# Patient Record
Sex: FEMALE | Race: WHITE | NOT HISPANIC OR LATINO | Employment: OTHER | URBAN - METROPOLITAN AREA
[De-identification: names, ages, dates, MRNs, and addresses within clinical notes are randomized per-mention and may not be internally consistent; named-entity substitution may affect disease eponyms.]

---

## 2019-04-25 ENCOUNTER — OFFICE VISIT (OUTPATIENT)
Dept: FAMILY MEDICINE CLINIC | Facility: CLINIC | Age: 64
End: 2019-04-25

## 2019-04-25 VITALS
OXYGEN SATURATION: 98 % | DIASTOLIC BLOOD PRESSURE: 88 MMHG | BODY MASS INDEX: 42.75 KG/M2 | HEIGHT: 66 IN | HEART RATE: 78 BPM | SYSTOLIC BLOOD PRESSURE: 132 MMHG | RESPIRATION RATE: 14 BRPM | WEIGHT: 266 LBS

## 2019-04-25 DIAGNOSIS — I10 ESSENTIAL HYPERTENSION: ICD-10-CM

## 2019-04-25 DIAGNOSIS — E78.5 HYPERLIPIDEMIA, UNSPECIFIED HYPERLIPIDEMIA TYPE: ICD-10-CM

## 2019-04-25 DIAGNOSIS — E11.628 DIABETIC FOOT INFECTION (HCC): ICD-10-CM

## 2019-04-25 DIAGNOSIS — I73.9 PERIPHERAL ARTERIAL DISEASE (HCC): ICD-10-CM

## 2019-04-25 DIAGNOSIS — M10.9 GOUT, UNSPECIFIED CAUSE, UNSPECIFIED CHRONICITY, UNSPECIFIED SITE: ICD-10-CM

## 2019-04-25 DIAGNOSIS — L08.9 DIABETIC FOOT INFECTION (HCC): ICD-10-CM

## 2019-04-25 DIAGNOSIS — E11.52 TYPE 2 DIABETES MELLITUS WITH DIABETIC PERIPHERAL ANGIOPATHY AND GANGRENE, WITHOUT LONG-TERM CURRENT USE OF INSULIN (HCC): Primary | ICD-10-CM

## 2019-04-25 LAB
ALBUMIN SERPL-MCNC: 4.3 G/DL (ref 3.5–5.2)
ALBUMIN/GLOB SERPL: 1.4 G/DL
ALP SERPL-CCNC: 107 U/L (ref 39–117)
ALT SERPL-CCNC: 11 U/L (ref 1–33)
AST SERPL-CCNC: 9 U/L (ref 1–32)
BILIRUB SERPL-MCNC: 0.3 MG/DL (ref 0.2–1.2)
BUN SERPL-MCNC: 15 MG/DL (ref 8–23)
BUN/CREAT SERPL: 22.1 (ref 7–25)
CALCIUM SERPL-MCNC: 10 MG/DL (ref 8.6–10.5)
CHLORIDE SERPL-SCNC: 98 MMOL/L (ref 98–107)
CHOLEST SERPL-MCNC: 232 MG/DL (ref 0–200)
CO2 SERPL-SCNC: 25.7 MMOL/L (ref 22–29)
CREAT SERPL-MCNC: 0.68 MG/DL (ref 0.57–1)
GLOBULIN SER CALC-MCNC: 3.1 GM/DL
GLUCOSE SERPL-MCNC: 250 MG/DL (ref 65–99)
HBA1C MFR BLD: 10.3 % (ref 4.8–5.6)
HDLC SERPL-MCNC: 49 MG/DL (ref 40–60)
LDLC SERPL CALC-MCNC: 138 MG/DL (ref 0–100)
POTASSIUM SERPL-SCNC: 4.6 MMOL/L (ref 3.5–5.2)
PROT SERPL-MCNC: 7.4 G/DL (ref 6–8.5)
SODIUM SERPL-SCNC: 137 MMOL/L (ref 136–145)
TRIGL SERPL-MCNC: 226 MG/DL (ref 0–150)
TSH SERPL DL<=0.005 MIU/L-ACNC: 1.28 MIU/ML (ref 0.27–4.2)
URATE SERPL-MCNC: 4 MG/DL (ref 2.4–5.7)
VLDLC SERPL CALC-MCNC: 45.2 MG/DL

## 2019-04-25 RX ORDER — GLIPIZIDE 5 MG/1
5 TABLET ORAL
Qty: 60 TABLET | Refills: 1 | Status: SHIPPED | OUTPATIENT
Start: 2019-04-25 | End: 2019-05-10 | Stop reason: DRUGHIGH

## 2019-04-25 RX ORDER — LISINOPRIL 5 MG/1
5 TABLET ORAL DAILY
Qty: 30 TABLET | Refills: 3 | Status: SHIPPED | OUTPATIENT
Start: 2019-04-25 | End: 2021-04-02

## 2019-04-25 RX ORDER — SULFAMETHOXAZOLE AND TRIMETHOPRIM 800; 160 MG/1; MG/1
1 TABLET ORAL 2 TIMES DAILY
Qty: 20 TABLET | Refills: 0 | Status: SHIPPED | OUTPATIENT
Start: 2019-04-25 | End: 2019-05-29

## 2019-04-26 LAB
ERYTHROCYTE [DISTWIDTH] IN BLOOD BY AUTOMATED COUNT: 15.8 % (ref 12.3–15.4)
HCT VFR BLD AUTO: 46.4 % (ref 34–46.6)
HGB BLD-MCNC: 14.1 G/DL (ref 12–15.9)
MCH RBC QN AUTO: 26.2 PG (ref 26.6–33)
MCHC RBC AUTO-ENTMCNC: 30.4 G/DL (ref 31.5–35.7)
MCV RBC AUTO: 86.1 FL (ref 79–97)
PLATELET # BLD AUTO: 248 10*3/MM3 (ref 140–450)
RBC # BLD AUTO: 5.39 10*6/MM3 (ref 3.77–5.28)
WBC # BLD AUTO: 8.95 10*3/MM3 (ref 3.4–10.8)

## 2019-05-10 DIAGNOSIS — E11.52 TYPE 2 DIABETES MELLITUS WITH DIABETIC PERIPHERAL ANGIOPATHY AND GANGRENE, WITHOUT LONG-TERM CURRENT USE OF INSULIN (HCC): ICD-10-CM

## 2019-05-10 RX ORDER — GLIPIZIDE 10 MG/1
10 TABLET ORAL
Qty: 90 TABLET | Refills: 0 | Status: SHIPPED | OUTPATIENT
Start: 2019-05-10 | End: 2019-07-09 | Stop reason: SDUPTHER

## 2019-05-10 RX ORDER — ATORVASTATIN CALCIUM 20 MG/1
20 TABLET, FILM COATED ORAL DAILY
Qty: 90 TABLET | Refills: 0 | Status: SHIPPED | OUTPATIENT
Start: 2019-05-10 | End: 2021-04-02

## 2019-05-29 ENCOUNTER — OFFICE VISIT (OUTPATIENT)
Dept: FAMILY MEDICINE CLINIC | Facility: CLINIC | Age: 64
End: 2019-05-29

## 2019-05-29 VITALS
DIASTOLIC BLOOD PRESSURE: 72 MMHG | SYSTOLIC BLOOD PRESSURE: 136 MMHG | HEART RATE: 72 BPM | WEIGHT: 267 LBS | RESPIRATION RATE: 18 BRPM | BODY MASS INDEX: 42.91 KG/M2 | OXYGEN SATURATION: 97 % | HEIGHT: 66 IN

## 2019-05-29 DIAGNOSIS — I73.9 PAD (PERIPHERAL ARTERY DISEASE) (HCC): Primary | ICD-10-CM

## 2019-05-29 DIAGNOSIS — E11.52 TYPE 2 DIABETES MELLITUS WITH DIABETIC PERIPHERAL ANGIOPATHY AND GANGRENE, WITHOUT LONG-TERM CURRENT USE OF INSULIN (HCC): Primary | ICD-10-CM

## 2019-05-29 DIAGNOSIS — Z72.0 TOBACCO ABUSE: ICD-10-CM

## 2019-05-29 DIAGNOSIS — I10 ESSENTIAL HYPERTENSION: ICD-10-CM

## 2019-05-29 DIAGNOSIS — E11.52 TYPE 2 DIABETES MELLITUS WITH DIABETIC PERIPHERAL ANGIOPATHY AND GANGRENE, WITHOUT LONG-TERM CURRENT USE OF INSULIN (HCC): ICD-10-CM

## 2019-05-29 DIAGNOSIS — R53.83 FATIGUE, UNSPECIFIED TYPE: ICD-10-CM

## 2019-05-29 DIAGNOSIS — I73.9 PERIPHERAL ARTERIAL DISEASE (HCC): ICD-10-CM

## 2019-05-29 DIAGNOSIS — I96 GANGRENE OF TOE OF RIGHT FOOT (HCC): ICD-10-CM

## 2019-05-29 PROBLEM — IMO0002 DIABETES MELLITUS TYPE 2, UNCONTROLLED, WITH COMPLICATIONS: Status: ACTIVE | Noted: 2017-04-12

## 2019-05-29 PROBLEM — E66.01 MORBID OBESITY: Status: ACTIVE | Noted: 2017-04-11

## 2019-05-29 PROBLEM — Z91.199 NONCOMPLIANCE: Status: ACTIVE | Noted: 2018-03-29

## 2019-05-29 PROBLEM — R06.09 DOE (DYSPNEA ON EXERTION): Status: ACTIVE | Noted: 2017-04-11

## 2019-05-29 PROBLEM — T14.8XXA WOUND INFECTION: Status: ACTIVE | Noted: 2018-04-19

## 2019-05-29 PROBLEM — L08.9 WOUND INFECTION: Status: ACTIVE | Noted: 2018-04-19

## 2019-05-29 PROCEDURE — 99215 OFFICE O/P EST HI 40 MIN: CPT | Performed by: FAMILY MEDICINE

## 2019-05-29 NOTE — PROGRESS NOTES
Subjective   Glenis Anderson is a 63 y.o. female.     History of Present Illness   She is a new patient to me today.  Previous pt of Dr Jackson here for surgical clearance.She has seen Dr. Jackson only once before on 4/25/2019.    She is scheduled for an amputation of 2nd toe Rt foot 5/31/19.  She is a smoker and has hx of uncontrolled  diabetes , hypertension, hyperlipidemia and peripheral vascular disease. She states she stopped Eliquis last Friday on the advice of her podiatrist..  She has not had a cardiac evaluation and she states she has not had chest pain or SOA.However, a conversation with her podiatrist indicated that she has a past hx of congestive heart failure per her report.    Uncontrolled DM2   Patient states that she stopped taking Lantis a few months ago due to cost. She did not tolerate metformin due to diarrhea. She was started on glipizide 10 mg twice a day by Dr. Jackson  On 5/10/2019. She says she is tolerating well. She has states she has little feeling in her feet.  HTN:  Previously managed with lisinopril. She states that she discontinued this medication because she did not feel that she needed it. re-started her on lisinopril 5 mg at her last visit  She is tolerating well. She denies any chest pain, dizziness, headaches, or acute vision changes.     Tobacco Abuse:  She has a greater than 50 year pack hx of cigarette smoking and is not motivated to quit.      Hx of PAD:  Patient currently takes apixaban 5 mg/daily. She reports several bilateral leg operations including femoral artery stents with Dr. Escalona and Dr. Girard. She also had her right great toe amputated due to gangrene about 3 years ago. She had her right second toenail removed about 2 months ago. She reports swelling, redness, and yellowish discoloration to the toe. She has been soaking her toe, cleaning the area with peroxide, and applying antibiotic cream. She denies any fevers or foot pain. She was seen and  evaluated by podiatrist, Dr. Bains, and he has determined she has dry gangrene and needs an amputation.    Gangrene of toe of right foot  I have reviewed procedure and timeline with Dr. Bains. He agrees with me that her chronic conditions and lifestyle put her at high risk for cardiac disease and he agrees that a cardiac evaluation prior to surgery would be in her best interest. He is concerned that the gangrene has progressed and she may need a more extensive surgery.         The following portions of the patient's history were reviewed and updated as appropriate: allergies, current medications, past family history, past medical history, past social history, past surgical history and problem list.    Review of Systems   Constitutional: Negative.    HENT: Negative.    Eyes: Negative.  Negative for visual disturbance.   Respiratory: Negative.    Cardiovascular: Negative.  Negative for chest pain and leg swelling.   Genitourinary: Negative.    Skin: Negative.    Neurological: Positive for numbness. Negative for dizziness and headache.   Hematological: Negative.    Psychiatric/Behavioral: Negative.        Objective   Physical Exam   Constitutional: She is oriented to person, place, and time. She appears well-developed.   HENT:   Head: Normocephalic and atraumatic.   edentulous   Eyes: EOM are normal. Pupils are equal, round, and reactive to light.   Neck: Normal range of motion. Neck supple.   Cardiovascular: Normal rate, regular rhythm and normal heart sounds.   Pulmonary/Chest: Effort normal. No respiratory distress.   Distant breath sounds   Abdominal: Soft. Bowel sounds are normal.   Musculoskeletal: Normal range of motion. She exhibits edema.   Right great toe absent, 2nd toe wrapped.   Neurological: She is alert and oriented to person, place, and time. A sensory deficit is present.   Skin: Skin is warm and dry. No rash noted.   Psychiatric: She has a normal mood and affect. Her behavior is normal. Judgment and  thought content normal.   Nursing note and vitals reviewed.        Assessment/Plan   Glenis was seen today for pre-op exam.  Current labs, past medical records and surgical plan reviewed and on this chart.  I was not comfortable clearing her without a cardiac consult.  I have advised her to re-start Eliquis.    Diagnoses and all orders for this visit:    PAD (peripheral artery disease) (CMS/[facility])  -     Ambulatory Referral to Cardiology  -     CBC (No Diff)  -     Comprehensive metabolic panel  She is going to re-start Eliquis.    Type 2 diabetes mellitus with diabetic peripheral angiopathy and gangrene, without long-term current use of insulin (CMS/[facility])  She is now taking glipzide, Lipitor and lisinopril. Will re-check HBA 1C in 2 months. Advised a healthy diet.   -     Ambulatory Referral to Cardiology  -     CBC (No Diff)  -     Comprehensive metabolic panel    Essential hypertension  She is tolerating lisinopril well.   -     CBC (No Diff)  -     Comprehensive metabolic panel    Tobacco abuse  She is not motivated to quit.    Gangrene of toe of right foot (CMS/[facility])  Surgery will be postponed until a cardiac consult is completed and cardiologist advice incorporated into surgical plan of care.    I have reviewed all the above with referring podiatrist and he agrees with this plan.    I spent over 50% of this 50 minute visit in face to face  with Glenis .

## 2019-05-30 LAB
ALBUMIN SERPL-MCNC: 3.8 G/DL (ref 3.5–5.2)
ALBUMIN/GLOB SERPL: 1.3 G/DL
ALP SERPL-CCNC: 113 U/L (ref 39–117)
ALT SERPL-CCNC: 27 U/L (ref 1–33)
AST SERPL-CCNC: 15 U/L (ref 1–32)
BILIRUB SERPL-MCNC: 0.3 MG/DL (ref 0.2–1.2)
BUN SERPL-MCNC: 16 MG/DL (ref 8–23)
BUN/CREAT SERPL: 24.2 (ref 7–25)
CALCIUM SERPL-MCNC: 9.9 MG/DL (ref 8.6–10.5)
CHLORIDE SERPL-SCNC: 102 MMOL/L (ref 98–107)
CO2 SERPL-SCNC: 27.8 MMOL/L (ref 22–29)
CREAT SERPL-MCNC: 0.66 MG/DL (ref 0.57–1)
ERYTHROCYTE [DISTWIDTH] IN BLOOD BY AUTOMATED COUNT: 15.8 % (ref 12.3–15.4)
GLOBULIN SER CALC-MCNC: 3 GM/DL
GLUCOSE SERPL-MCNC: 157 MG/DL (ref 65–99)
HCT VFR BLD AUTO: 44.7 % (ref 34–46.6)
HGB BLD-MCNC: 13.6 G/DL (ref 12–15.9)
MCH RBC QN AUTO: 26.7 PG (ref 26.6–33)
MCHC RBC AUTO-ENTMCNC: 30.4 G/DL (ref 31.5–35.7)
MCV RBC AUTO: 87.8 FL (ref 79–97)
PLATELET # BLD AUTO: 266 10*3/MM3 (ref 140–450)
POTASSIUM SERPL-SCNC: 4.6 MMOL/L (ref 3.5–5.2)
PROT SERPL-MCNC: 6.8 G/DL (ref 6–8.5)
RBC # BLD AUTO: 5.09 10*6/MM3 (ref 3.77–5.28)
SODIUM SERPL-SCNC: 142 MMOL/L (ref 136–145)
WBC # BLD AUTO: 9.52 10*3/MM3 (ref 3.4–10.8)

## 2019-06-05 ENCOUNTER — OFFICE VISIT (OUTPATIENT)
Dept: CARDIOLOGY | Facility: CLINIC | Age: 64
End: 2019-06-05

## 2019-06-05 VITALS
HEART RATE: 81 BPM | SYSTOLIC BLOOD PRESSURE: 150 MMHG | BODY MASS INDEX: 43.65 KG/M2 | HEIGHT: 66 IN | RESPIRATION RATE: 16 BRPM | WEIGHT: 271.6 LBS | DIASTOLIC BLOOD PRESSURE: 80 MMHG

## 2019-06-05 DIAGNOSIS — IMO0002 DIABETES MELLITUS TYPE 2, UNCONTROLLED, WITH COMPLICATIONS: ICD-10-CM

## 2019-06-05 DIAGNOSIS — E66.01 MORBID OBESITY (HCC): ICD-10-CM

## 2019-06-05 DIAGNOSIS — E78.2 MIXED HYPERLIPIDEMIA: ICD-10-CM

## 2019-06-05 DIAGNOSIS — I96 GANGRENE OF TOE OF RIGHT FOOT (HCC): ICD-10-CM

## 2019-06-05 DIAGNOSIS — I73.9 PVD (PERIPHERAL VASCULAR DISEASE) (HCC): ICD-10-CM

## 2019-06-05 DIAGNOSIS — Z01.810 PREOPERATIVE CARDIOVASCULAR EXAMINATION: Primary | ICD-10-CM

## 2019-06-05 DIAGNOSIS — Z72.0 TOBACCO ABUSE: ICD-10-CM

## 2019-06-05 PROCEDURE — 99204 OFFICE O/P NEW MOD 45 MIN: CPT | Performed by: INTERNAL MEDICINE

## 2019-06-05 PROCEDURE — 93000 ELECTROCARDIOGRAM COMPLETE: CPT | Performed by: INTERNAL MEDICINE

## 2019-06-05 NOTE — PROGRESS NOTES
PATIENTINFORMATION    Date of Office Visit: 2019  Encounter Provider: Shawna Carreon MD  Place of Service: [facility] CARDIOLOGY  Patient Name: Glenis Anderson  : 1955    Subjective:     Encounter Date:2019      Patient ID: Glenis Anderson is a 63 y.o. female.      History of Present Illness    This is a lady with history of diabetes, not on insulin, hypertension, hyperlipidemia, peripheral vascular disease. She also continues to smoke a half a pack of cigarettes a day which is down from 3 packs a day. She is here for preoperative evaluation. She reports she has already had to have her right 1st toe removed. Her right 2nd toe is gangrenous and needs to be removed. She is limited in what she can do physically, mostly due to pain in her foot. She said she had to stop on her way in from the parking lot to our building because of foot pain. She denies palpitations, lightheadedness, chest pain, or fatigue. She is short of breath with exertion and has some swelling in her right leg.      Review of Systems   Constitution: Negative for fever, malaise/fatigue, weight gain and weight loss.   HENT: Negative for ear pain, hearing loss, nosebleeds and sore throat.    Eyes: Negative for double vision, pain, vision loss in left eye and vision loss in right eye.   Cardiovascular: Positive for leg swelling.        See history of present illness.   Respiratory: Positive for cough. Negative for shortness of breath, sleep disturbances due to breathing, snoring and wheezing.    Endocrine: Negative for cold intolerance, heat intolerance and polyuria.   Skin: Negative for itching, poor wound healing and rash.   Musculoskeletal: Negative for joint pain, joint swelling and myalgias.   Gastrointestinal: Negative for abdominal pain, diarrhea, hematochezia, nausea and vomiting.   Genitourinary: Negative for hematuria and hesitancy.   Neurological: Negative for numbness, paresthesias and  "seizures.   Psychiatric/Behavioral: Negative for depression. The patient is not nervous/anxious.            ECG 12 Lead  Date/Time: 6/5/2019 8:24 AM  Performed by: Shawna Carreon MD  Authorized by: Shawna Carreon MD   Comparison: compared with previous ECG from 4/19/2018  Similar to previous ECG  Rhythm: sinus rhythm  BPM: 81  Conduction: conduction normal  ST Segments: ST segments normal  T Waves: T waves normal    Clinical impression: normal ECG               Objective:     /80 (BP Location: Right arm, Patient Position: Sitting, Cuff Size: Adult)   Pulse 81   Resp 16   Ht 167.6 cm (66\")   Wt 123 kg (271 lb 9.6 oz)   BMI 43.84 kg/m²  Body mass index is 43.84 kg/m².     Physical Exam   Constitutional: She appears well-developed.   HENT:   Head: Normocephalic and atraumatic.   Eyes: Conjunctivae and lids are normal. Pupils are equal, round, and reactive to light. Lids are everted and swept, no foreign bodies found.   Neck: Normal range of motion. No JVD present. Carotid bruit is not present. No tracheal deviation present. No thyroid mass present.   Cardiovascular: Normal rate, regular rhythm and normal heart sounds.   Pulses:       Dorsalis pedis pulses are 2+ on the right side, and 2+ on the left side.   Pulmonary/Chest: Effort normal and breath sounds normal.   Abdominal: Normal appearance and bowel sounds are normal.   Musculoskeletal: Normal range of motion.   Neurological: She is alert. She has normal strength.   Skin: Skin is warm, dry and intact.   Psychiatric: She has a normal mood and affect. Her behavior is normal.   Vitals reviewed.          Assessment/Plan:       1.  Preoperative evaluation. This is a low risk procedure. I recommend that she proceed without further testing. If her symptoms change, though, I definitely want to see her back.  2.  Diabetes. Not on insulin.  3.  Hypertension. She was a little high today. I encouraged her to monitor her blood pressure at home.  4.  " Hyperlipidemia.  5.  Tobacco use. Encouraged to quit smoking.  6.  Peripheral arterial disease. Followed by Amy Lopez MD.    I will see her back as needed if her symptoms change.      Orders Placed This Encounter   Procedures   • ECG 12 Lead     This order was created via procedure documentation        Discharge Medications           Accurate as of 6/5/19 12:20 PM. If you have any questions, ask your nurse or doctor.               Continue These Medications      Instructions Start Date   apixaban 5 MG tablet tablet  Commonly known as:  ELIQUIS   5 mg, Oral, Every 12 Hours Scheduled      atorvastatin 20 MG tablet  Commonly known as:  LIPITOR   20 mg, Oral, Daily      glipiZIDE 10 MG tablet  Commonly known as:  GLUCOTROL   10 mg, Oral, 2 Times Daily Before Meals      glucose blood test strip   Check blood sugar twice daily and if dizzy      lisinopril 5 MG tablet  Commonly known as:  PRINIVIL,ZESTRIL   5 mg, Oral, Daily                    Shawna Carreon MD  06/05/19  12:20 PM

## 2019-07-09 RX ORDER — GLIPIZIDE 10 MG/1
TABLET ORAL
Qty: 90 TABLET | Refills: 0 | Status: SHIPPED | OUTPATIENT
Start: 2019-07-09 | End: 2021-04-02

## 2021-04-02 ENCOUNTER — APPOINTMENT (OUTPATIENT)
Dept: GENERAL RADIOLOGY | Facility: HOSPITAL | Age: 66
End: 2021-04-02

## 2021-04-02 ENCOUNTER — HOSPITAL ENCOUNTER (INPATIENT)
Facility: HOSPITAL | Age: 66
LOS: 1 days | Discharge: NURSING, REHAB OR LONG-TERM CARE | End: 2021-04-05
Attending: EMERGENCY MEDICINE | Admitting: INTERNAL MEDICINE

## 2021-04-02 ENCOUNTER — APPOINTMENT (OUTPATIENT)
Dept: CARDIOLOGY | Facility: HOSPITAL | Age: 66
End: 2021-04-02

## 2021-04-02 DIAGNOSIS — Z86.79 HISTORY OF CHRONIC CHF: ICD-10-CM

## 2021-04-02 DIAGNOSIS — R60.0 EDEMA OF LEFT UPPER EXTREMITY: Primary | ICD-10-CM

## 2021-04-02 PROBLEM — I50.23 ACUTE ON CHRONIC SYSTOLIC CONGESTIVE HEART FAILURE (HCC): Status: ACTIVE | Noted: 2021-04-02

## 2021-04-02 PROBLEM — I50.9 CHF (CONGESTIVE HEART FAILURE) (HCC): Status: ACTIVE | Noted: 2021-04-02

## 2021-04-02 PROBLEM — I10 HTN (HYPERTENSION): Status: ACTIVE | Noted: 2021-04-02

## 2021-04-02 LAB
ALBUMIN SERPL-MCNC: 3.6 G/DL (ref 3.5–5.2)
ALBUMIN/GLOB SERPL: 1.3 G/DL
ALP SERPL-CCNC: 198 U/L (ref 39–117)
ALT SERPL W P-5'-P-CCNC: 8 U/L (ref 1–33)
ANION GAP SERPL CALCULATED.3IONS-SCNC: 9.8 MMOL/L (ref 5–15)
AST SERPL-CCNC: 14 U/L (ref 1–32)
BACTERIA UR QL AUTO: ABNORMAL /HPF
BASOPHILS # BLD AUTO: 0.02 10*3/MM3 (ref 0–0.2)
BASOPHILS NFR BLD AUTO: 0.4 % (ref 0–1.5)
BILIRUB SERPL-MCNC: 1.3 MG/DL (ref 0–1.2)
BILIRUB UR QL STRIP: NEGATIVE
BUN SERPL-MCNC: 15 MG/DL (ref 8–23)
BUN/CREAT SERPL: 20.3 (ref 7–25)
CALCIUM SPEC-SCNC: 8.5 MG/DL (ref 8.6–10.5)
CHLORIDE SERPL-SCNC: 95 MMOL/L (ref 98–107)
CLARITY UR: ABNORMAL
CO2 SERPL-SCNC: 35.2 MMOL/L (ref 22–29)
COLOR UR: YELLOW
CREAT SERPL-MCNC: 0.74 MG/DL (ref 0.57–1)
DEPRECATED RDW RBC AUTO: 44.3 FL (ref 37–54)
EOSINOPHIL # BLD AUTO: 0.27 10*3/MM3 (ref 0–0.4)
EOSINOPHIL NFR BLD AUTO: 5.8 % (ref 0.3–6.2)
ERYTHROCYTE [DISTWIDTH] IN BLOOD BY AUTOMATED COUNT: 13.8 % (ref 12.3–15.4)
GFR SERPL CREATININE-BSD FRML MDRD: 78 ML/MIN/1.73
GLOBULIN UR ELPH-MCNC: 2.8 GM/DL
GLUCOSE BLDC GLUCOMTR-MCNC: 110 MG/DL (ref 70–130)
GLUCOSE SERPL-MCNC: 115 MG/DL (ref 65–99)
GLUCOSE UR STRIP-MCNC: NEGATIVE MG/DL
HCT VFR BLD AUTO: 37.4 % (ref 34–46.6)
HGB BLD-MCNC: 11.9 G/DL (ref 12–15.9)
HGB UR QL STRIP.AUTO: ABNORMAL
HYALINE CASTS UR QL AUTO: ABNORMAL /LPF
IMM GRANULOCYTES # BLD AUTO: 0.02 10*3/MM3 (ref 0–0.05)
IMM GRANULOCYTES NFR BLD AUTO: 0.4 % (ref 0–0.5)
INR PPP: 1.42 (ref 0.9–1.1)
KETONES UR QL STRIP: NEGATIVE
LEUKOCYTE ESTERASE UR QL STRIP.AUTO: ABNORMAL
LYMPHOCYTES # BLD AUTO: 1.24 10*3/MM3 (ref 0.7–3.1)
LYMPHOCYTES NFR BLD AUTO: 26.8 % (ref 19.6–45.3)
MAGNESIUM SERPL-MCNC: 1.9 MG/DL (ref 1.6–2.4)
MCH RBC QN AUTO: 28.1 PG (ref 26.6–33)
MCHC RBC AUTO-ENTMCNC: 31.8 G/DL (ref 31.5–35.7)
MCV RBC AUTO: 88.2 FL (ref 79–97)
MONOCYTES # BLD AUTO: 0.59 10*3/MM3 (ref 0.1–0.9)
MONOCYTES NFR BLD AUTO: 12.8 % (ref 5–12)
NEUTROPHILS NFR BLD AUTO: 2.48 10*3/MM3 (ref 1.7–7)
NEUTROPHILS NFR BLD AUTO: 53.8 % (ref 42.7–76)
NITRITE UR QL STRIP: NEGATIVE
NRBC BLD AUTO-RTO: 0 /100 WBC (ref 0–0.2)
NT-PROBNP SERPL-MCNC: 2873 PG/ML (ref 0–900)
PH UR STRIP.AUTO: 7.5 [PH] (ref 5–8)
PLATELET # BLD AUTO: 151 10*3/MM3 (ref 140–450)
PMV BLD AUTO: 11.6 FL (ref 6–12)
POTASSIUM SERPL-SCNC: 3.6 MMOL/L (ref 3.5–5.2)
PROCALCITONIN SERPL-MCNC: 0.08 NG/ML (ref 0–0.25)
PROT SERPL-MCNC: 6.4 G/DL (ref 6–8.5)
PROT UR QL STRIP: ABNORMAL
PROTHROMBIN TIME: 17.2 SECONDS (ref 11.7–14.2)
QT INTERVAL: 380 MS
RBC # BLD AUTO: 4.24 10*6/MM3 (ref 3.77–5.28)
RBC # UR: ABNORMAL /HPF
REF LAB TEST METHOD: ABNORMAL
SODIUM SERPL-SCNC: 140 MMOL/L (ref 136–145)
SP GR UR STRIP: 1.01 (ref 1–1.03)
SQUAMOUS #/AREA URNS HPF: ABNORMAL /HPF
TROPONIN T SERPL-MCNC: <0.01 NG/ML (ref 0–0.03)
UROBILINOGEN UR QL STRIP: ABNORMAL
WBC # BLD AUTO: 4.62 10*3/MM3 (ref 3.4–10.8)
WBC UR QL AUTO: ABNORMAL /HPF

## 2021-04-02 PROCEDURE — 93971 EXTREMITY STUDY: CPT

## 2021-04-02 PROCEDURE — 25010000002 ONDANSETRON PER 1 MG: Performed by: NURSE PRACTITIONER

## 2021-04-02 PROCEDURE — P9612 CATHETERIZE FOR URINE SPEC: HCPCS

## 2021-04-02 PROCEDURE — G0378 HOSPITAL OBSERVATION PER HR: HCPCS

## 2021-04-02 PROCEDURE — 71045 X-RAY EXAM CHEST 1 VIEW: CPT

## 2021-04-02 PROCEDURE — 87150 DNA/RNA AMPLIFIED PROBE: CPT | Performed by: NURSE PRACTITIONER

## 2021-04-02 PROCEDURE — 85610 PROTHROMBIN TIME: CPT | Performed by: NURSE PRACTITIONER

## 2021-04-02 PROCEDURE — U0005 INFEC AGEN DETEC AMPLI PROBE: HCPCS | Performed by: NURSE PRACTITIONER

## 2021-04-02 PROCEDURE — 84145 PROCALCITONIN (PCT): CPT | Performed by: NURSE PRACTITIONER

## 2021-04-02 PROCEDURE — 80053 COMPREHEN METABOLIC PANEL: CPT | Performed by: NURSE PRACTITIONER

## 2021-04-02 PROCEDURE — 84484 ASSAY OF TROPONIN QUANT: CPT | Performed by: NURSE PRACTITIONER

## 2021-04-02 PROCEDURE — 93010 ELECTROCARDIOGRAM REPORT: CPT | Performed by: INTERNAL MEDICINE

## 2021-04-02 PROCEDURE — 93922 UPR/L XTREMITY ART 2 LEVELS: CPT

## 2021-04-02 PROCEDURE — 99284 EMERGENCY DEPT VISIT MOD MDM: CPT

## 2021-04-02 PROCEDURE — U0004 COV-19 TEST NON-CDC HGH THRU: HCPCS | Performed by: NURSE PRACTITIONER

## 2021-04-02 PROCEDURE — 85025 COMPLETE CBC W/AUTO DIFF WBC: CPT | Performed by: NURSE PRACTITIONER

## 2021-04-02 PROCEDURE — 81001 URINALYSIS AUTO W/SCOPE: CPT | Performed by: NURSE PRACTITIONER

## 2021-04-02 PROCEDURE — 93005 ELECTROCARDIOGRAM TRACING: CPT | Performed by: NURSE PRACTITIONER

## 2021-04-02 PROCEDURE — 87147 CULTURE TYPE IMMUNOLOGIC: CPT | Performed by: NURSE PRACTITIONER

## 2021-04-02 PROCEDURE — 83880 ASSAY OF NATRIURETIC PEPTIDE: CPT | Performed by: NURSE PRACTITIONER

## 2021-04-02 PROCEDURE — 83735 ASSAY OF MAGNESIUM: CPT | Performed by: NURSE PRACTITIONER

## 2021-04-02 PROCEDURE — 87040 BLOOD CULTURE FOR BACTERIA: CPT | Performed by: NURSE PRACTITIONER

## 2021-04-02 PROCEDURE — 82962 GLUCOSE BLOOD TEST: CPT

## 2021-04-02 RX ORDER — ONDANSETRON 2 MG/ML
4 INJECTION INTRAMUSCULAR; INTRAVENOUS EVERY 6 HOURS PRN
Status: DISCONTINUED | OUTPATIENT
Start: 2021-04-02 | End: 2021-04-05 | Stop reason: HOSPADM

## 2021-04-02 RX ORDER — ACETAMINOPHEN 500 MG
1000 TABLET ORAL EVERY 8 HOURS PRN
COMMUNITY

## 2021-04-02 RX ORDER — ACETAMINOPHEN 325 MG/1
650 TABLET ORAL EVERY 4 HOURS PRN
Status: DISCONTINUED | OUTPATIENT
Start: 2021-04-02 | End: 2021-04-05 | Stop reason: HOSPADM

## 2021-04-02 RX ORDER — SODIUM CHLORIDE 0.9 % (FLUSH) 0.9 %
10 SYRINGE (ML) INJECTION AS NEEDED
Status: DISCONTINUED | OUTPATIENT
Start: 2021-04-02 | End: 2021-04-05 | Stop reason: HOSPADM

## 2021-04-02 RX ORDER — ACETAMINOPHEN 160 MG/5ML
650 SOLUTION ORAL EVERY 4 HOURS PRN
Status: DISCONTINUED | OUTPATIENT
Start: 2021-04-02 | End: 2021-04-05 | Stop reason: HOSPADM

## 2021-04-02 RX ORDER — CLONAZEPAM 0.5 MG/1
0.5 TABLET ORAL ONCE
Status: COMPLETED | OUTPATIENT
Start: 2021-04-02 | End: 2021-04-02

## 2021-04-02 RX ORDER — CLONAZEPAM 0.5 MG/1
0.5 TABLET ORAL EVERY 8 HOURS
COMMUNITY
End: 2021-04-05 | Stop reason: HOSPADM

## 2021-04-02 RX ORDER — POLYETHYLENE GLYCOL 3350 17 G/17G
17 POWDER, FOR SOLUTION ORAL DAILY PRN
COMMUNITY

## 2021-04-02 RX ORDER — LOPERAMIDE HYDROCHLORIDE 2 MG/1
2 CAPSULE ORAL AS NEEDED
COMMUNITY

## 2021-04-02 RX ORDER — OXYCODONE HYDROCHLORIDE AND ACETAMINOPHEN 5; 325 MG/1; MG/1
1 TABLET ORAL EVERY 6 HOURS PRN
Status: DISCONTINUED | OUTPATIENT
Start: 2021-04-02 | End: 2021-04-05 | Stop reason: HOSPADM

## 2021-04-02 RX ORDER — BUMETANIDE 2 MG/1
4 TABLET ORAL DAILY
COMMUNITY

## 2021-04-02 RX ORDER — ACETAMINOPHEN 650 MG/1
650 SUPPOSITORY RECTAL EVERY 4 HOURS PRN
Status: DISCONTINUED | OUTPATIENT
Start: 2021-04-02 | End: 2021-04-05 | Stop reason: HOSPADM

## 2021-04-02 RX ORDER — DEXTROSE MONOHYDRATE 25 G/50ML
25 INJECTION, SOLUTION INTRAVENOUS
Status: DISCONTINUED | OUTPATIENT
Start: 2021-04-02 | End: 2021-04-05 | Stop reason: HOSPADM

## 2021-04-02 RX ORDER — SODIUM CHLORIDE 0.9 % (FLUSH) 0.9 %
10 SYRINGE (ML) INJECTION EVERY 12 HOURS SCHEDULED
Status: DISCONTINUED | OUTPATIENT
Start: 2021-04-02 | End: 2021-04-05 | Stop reason: HOSPADM

## 2021-04-02 RX ORDER — BUMETANIDE 1 MG/1
1 TABLET ORAL DAILY
COMMUNITY
Start: 2021-03-31 | End: 2021-04-05

## 2021-04-02 RX ORDER — INSULIN LISPRO 100 [IU]/ML
0-9 INJECTION, SOLUTION INTRAVENOUS; SUBCUTANEOUS
Status: DISCONTINUED | OUTPATIENT
Start: 2021-04-02 | End: 2021-04-05 | Stop reason: HOSPADM

## 2021-04-02 RX ORDER — NICOTINE POLACRILEX 4 MG
15 LOZENGE BUCCAL
Status: DISCONTINUED | OUTPATIENT
Start: 2021-04-02 | End: 2021-04-05 | Stop reason: HOSPADM

## 2021-04-02 RX ORDER — CLONAZEPAM 0.5 MG/1
0.5 TABLET ORAL EVERY 4 HOURS PRN
Status: ON HOLD | COMMUNITY
End: 2021-04-05 | Stop reason: SDUPTHER

## 2021-04-02 RX ORDER — CYCLOBENZAPRINE HCL 10 MG
10 TABLET ORAL 3 TIMES DAILY PRN
COMMUNITY

## 2021-04-02 RX ORDER — FENTANYL 25 UG/H
1 PATCH TRANSDERMAL
Status: ON HOLD | COMMUNITY
End: 2021-04-05 | Stop reason: SDUPTHER

## 2021-04-02 RX ORDER — OXYCODONE HYDROCHLORIDE AND ACETAMINOPHEN 5; 325 MG/1; MG/1
1 TABLET ORAL ONCE
Status: COMPLETED | OUTPATIENT
Start: 2021-04-02 | End: 2021-04-02

## 2021-04-02 RX ORDER — BUMETANIDE 0.25 MG/ML
2 INJECTION INTRAMUSCULAR; INTRAVENOUS EVERY 12 HOURS
Status: DISCONTINUED | OUTPATIENT
Start: 2021-04-03 | End: 2021-04-05 | Stop reason: HOSPADM

## 2021-04-02 RX ORDER — BUMETANIDE 0.25 MG/ML
2 INJECTION INTRAMUSCULAR; INTRAVENOUS ONCE
Status: COMPLETED | OUTPATIENT
Start: 2021-04-02 | End: 2021-04-02

## 2021-04-02 RX ADMIN — SODIUM CHLORIDE, PRESERVATIVE FREE 10 ML: 5 INJECTION INTRAVENOUS at 21:31

## 2021-04-02 RX ADMIN — OXYCODONE HYDROCHLORIDE AND ACETAMINOPHEN 1 TABLET: 5; 325 TABLET ORAL at 21:31

## 2021-04-02 RX ADMIN — BUMETANIDE 2 MG: 0.25 INJECTION, SOLUTION INTRAMUSCULAR; INTRAVENOUS at 17:59

## 2021-04-02 RX ADMIN — CLONAZEPAM 0.5 MG: 0.5 TABLET ORAL at 21:30

## 2021-04-02 RX ADMIN — ONDANSETRON 4 MG: 2 INJECTION INTRAMUSCULAR; INTRAVENOUS at 23:43

## 2021-04-02 RX ADMIN — OXYCODONE HYDROCHLORIDE AND ACETAMINOPHEN 1 TABLET: 5; 325 TABLET ORAL at 18:00

## 2021-04-02 NOTE — PROGRESS NOTES
Left upper venous and arterial doppler complete and preliminary is negative for dvt and WBI is within normal limits to [place]y [place], IVAN

## 2021-04-02 NOTE — ED TRIAGE NOTES
Pt was brought in by [employer] from [facility] for left side swelling. Pt states that she has had swelling to left side of her body for the last couple days. Denies change in soa.    Pt wearing mask on arrival. Staff wearing mask and goggles at time of triage.

## 2021-04-02 NOTE — PROGRESS NOTES
Clinical Pharmacy Services: Medication History    Glenis Anderson is a 65 y.o. female presenting to [facility] for   Chief Complaint   Patient presents with   • Edema       She  has a past medical history of CHF (congestive heart failure) (CMS/[facility]), Gangrene of toe of right foot (CMS/[facility]), Hyperlipidemia, Hypertension, PAD (peripheral artery disease) (CMS/H[facility]), Peripheral vascular disease (CMS/[facility]), Tobacco abuse, and Type 2 diabetes mellitus (CMS/[facility]).    Allergies as of 04/02/2021 - Reviewed 04/02/2021   Allergen Reaction Noted   • Dilaudid [hydromorphone] Unknown - Low Severity 04/02/2021   • Morphine Nausea And Vomiting 04/25/2019       Medication information was obtained from: [facility] paperwork  Pharmacy and Phone Number:     Prior to Admission Medications     Prescriptions Last Dose Informant Patient Reported? Taking?    acetaminophen (TYLENOL) 500 MG tablet  Nursing Home Yes Yes    Take 1,000 mg by mouth Every 8 (Eight) Hours As Needed for Mild Pain .    bumetanide (BUMEX) 1 MG tablet  Nursing Home Yes Yes    Take 1 mg by mouth Daily. afternoon    bumetanide (BUMEX) 2 MG tablet  Nursing Home Yes Yes    Take 4 mg by mouth Daily.    clonazePAM (KlonoPIN) 0.5 MG tablet  Nursing Home Yes Yes    Take 0.5 mg by mouth Every 4 (Four) Hours As Needed.    clonazePAM (KlonoPIN) 0.5 MG tablet  Nursing Home Yes Yes    Take 0.5 mg by mouth Every 8 (Eight) Hours.    cyclobenzaprine (FLEXERIL) 10 MG tablet  Nursing Home Yes Yes    Take 10 mg by mouth 3 (Three) Times a Day As Needed for Muscle Spasms.    fentaNYL (DURAGESIC) 25 MCG/HR patch  Nursing Home Yes Yes    Place 1 patch on the skin as directed by provider Every 72 (Seventy-Two) Hours.    loperamide (IMODIUM) 2 MG capsule  Nursing Home Yes Yes    Take 2 mg by mouth As Needed for Diarrhea. Max of 6 tablets per day    polyethylene glycol (MiraLax) 17 g packet  Nursing Home Yes Yes    Take 17 g by mouth Daily As Needed.            Medication notes:      This medication list is complete to the best of my knowledge as of 4/2/2021    Please call if questions.    Shawna Benton [facility]  Medication History Technician  [phone]    4/2/2021 19:24 EDT

## 2021-04-02 NOTE — H&P
Patient Name:  Glenis Anderson  YOB: 1955  MRN:  [id number]  Admit Date:  4/2/2021  Patient Care Team:  Provider, No Known as PCP - Lennox Moore DPM (Podiatry)      Chief Complaint   Patient presents with   • Edema       Subjective     Ms. Anderson is a 65 y.o. female with a history of HLD, HTN, DM2, CHF, PVD, R AKA that presents to [facility] complaining of left arm swelling and pain. Patient reports that she lives in a nursing home and several weeks ago pulled herself up in her bed, causing pain to left upper arm that has persisted since that time. Patient reports that this pain is constant but worse with any movement, and reports increased swelling to left arm, left breast, abdomen and left leg in the past 3 weeks. Patient reports that she takes Bumex at home, but due to feeling like the nursing home was short staffed and not wanting to sit in urine for too long, she decided to cut her dose in half and only take one pill a day instead of the two she was prescribed. She reports this change happened about 3-4 weeks ago as well. She reports that a catheter was placed a couple days in by the nursing facility and she has this in place at this time. She does report foul smelling urine earlier today, though no previous urinary complaints noted. Patient reports intermittent chest pressure and chronic shortness of breath, denies fever, abdominal pain, changes in bowel or bladder habits, or weakness. Labs done tonight show elevated BNP and dopplers done tonight were negative for DVT. Patient was give dose of IV Bumex and pain medication while in ED. Blood cultures are also pending.    History of Present Illness    Past Medical History:   Diagnosis Date   • CHF (congestive heart failure) (CMS/HCC)    • Gangrene of toe of right foot (CMS/HCC)    • Hyperlipidemia    • Hypertension    • PAD (peripheral artery disease) (CMS/HCC)    • Peripheral vascular disease (CMS/HCC)    • Tobacco  abuse    • Type 2 diabetes mellitus (CMS/HCC)      Past Surgical History:   Procedure Laterality Date   • APPENDECTOMY  2003   • FEMORAL ARTERY STENT     • FOOT SURGERY     • HYSTERECTOMY  2002   • TOE AMPUTATION Right      No family history on file.  Social History     Tobacco Use   • Smoking status: Current Every Day Smoker     Packs/day: 1.00     Years: 50.00     Pack years: 50.00     Types: Cigarettes   • Smokeless tobacco: Never Used   • Tobacco comment: daily caffiene   Substance Use Topics   • Alcohol use: No   • Drug use: No     (Not in a hospital admission)    Allergies:    Allergies   Allergen Reactions   • Dilaudid [Hydromorphone] Unknown - Low Severity   • Morphine Nausea And Vomiting       Review of Systems   Constitutional: Negative.  Negative for chills and fever.   HENT: Negative.  Negative for congestion and sore throat.    Eyes: Negative.  Negative for visual disturbance.   Respiratory: Positive for shortness of breath (chronic). Negative for cough.    Cardiovascular: Positive for chest pain (intermittent pressure).   Gastrointestinal: Positive for abdominal distention. Negative for nausea and vomiting.   Endocrine: Negative.    Genitourinary: Negative.  Negative for dysuria, frequency and urgency.   Musculoskeletal: Negative.  Negative for arthralgias and myalgias.   Skin: Negative.  Negative for color change and pallor.   Allergic/Immunologic: Negative.    Neurological: Negative.  Negative for dizziness, weakness and light-headedness.   Hematological: Negative.    Psychiatric/Behavioral: Negative.  Negative for agitation, behavioral problems and confusion.        Objective    Vital Signs  Temp:  [96.8 °F (36 °C)] 96.8 °F (36 °C)  Heart Rate:  [84] 84  Resp:  [18] 18  BP: (135)/(81) 135/81  SpO2:  [99 %] 99 %  on  Flow (L/min):  [2] 2;   Device (Oxygen Therapy): nasal cannula  Body mass index is 43.84 kg/m².    Physical Exam  Vitals and nursing note reviewed.   Constitutional:       General: She  is not in acute distress.     Appearance: Normal appearance. She is obese.   HENT:      Head: Normocephalic and atraumatic.      Mouth/Throat:      Mouth: Mucous membranes are moist.   Eyes:      Extraocular Movements: Extraocular movements intact.   Cardiovascular:      Rate and Rhythm: Rhythm irregularly irregular.      Pulses:           Dorsalis pedis pulses are 0 on the right side.        Posterior tibial pulses are 0 on the right side.   Pulmonary:      Effort: Pulmonary effort is normal. No respiratory distress.      Breath sounds: Examination of the right-middle field reveals decreased breath sounds. Examination of the left-middle field reveals decreased breath sounds. Examination of the right-lower field reveals decreased breath sounds. Examination of the left-lower field reveals decreased breath sounds. Decreased breath sounds present.      Comments: 2L NC chronic  Abdominal:      General: Bowel sounds are normal.      Palpations: Abdomen is soft.      Tenderness: There is generalized abdominal tenderness. There is no guarding or rebound.   Genitourinary:     Comments: King cath  Musculoskeletal:         General: Swelling (severe edema to L arm and L leg) and tenderness (LUE) present.      Cervical back: Normal range of motion and neck supple.      Comments: Decreased ROM L side due to swelling and pain, R AKA   Skin:     General: Skin is warm.   Neurological:      General: No focal deficit present.      Mental Status: She is alert and oriented to person, place, and time. Mental status is at baseline.   Psychiatric:         Mood and Affect: Mood normal.         Behavior: Behavior normal.         Thought Content: Thought content normal.         Judgment: Judgment normal.         Results Review:   I reviewed the patient's new clinical results including all labs and xrays.    Lab Results (last 24 hours)     Procedure Component Value Units Date/Time    CBC & Differential [[id number]]  (Abnormal) Collected:  04/02/21 1513    Specimen: Blood Updated: 04/02/21 1532    Narrative:      The following orders were created for panel order CBC & Differential.  Procedure                               Abnormality         Status                     ---------                               -----------         ------                     CBC Auto Differential[[id number]]        Abnormal            Final result                 Please view results for these tests on the individual orders.    Comprehensive Metabolic Panel [[id number]]  (Abnormal) Collected: 04/02/21 1513    Specimen: Blood Updated: 04/02/21 1547     Glucose 115 mg/dL      BUN 15 mg/dL      Creatinine 0.74 mg/dL      Sodium 140 mmol/L      Potassium 3.6 mmol/L      Chloride 95 mmol/L      CO2 35.2 mmol/L      Calcium 8.5 mg/dL      Total Protein 6.4 g/dL      Albumin 3.60 g/dL      ALT (SGPT) 8 U/L      AST (SGOT) 14 U/L      Alkaline Phosphatase 198 U/L      Total Bilirubin 1.3 mg/dL      eGFR Non African Amer 78 mL/min/1.73      Globulin 2.8 gm/dL      A/G Ratio 1.3 g/dL      BUN/Creatinine Ratio 20.3     Anion Gap 9.8 mmol/L     Narrative:      GFR Normal >60  Chronic Kidney Disease <60  Kidney Failure <15      Protime-INR [[id number]]  (Abnormal) Collected: 04/02/21 1513    Specimen: Blood Updated: 04/02/21 1541     Protime 17.2 Seconds      INR 1.42    BNP [[id number]]  (Abnormal) Collected: 04/02/21 1513    Specimen: Blood Updated: 04/02/21 1545     proBNP 2,873.0 pg/mL     Narrative:      Among patients with dyspnea, NT-proBNP is highly sensitive for the detection of acute congestive heart failure. In addition NT-proBNP of <300 pg/ml effectively rules out acute congestive heart failure with 99% negative predictive value.    Results may be falsely decreased if patient taking Biotin.      Troponin [[id number]]  (Normal) Collected: 04/02/21 1513    Specimen: Blood Updated: 04/02/21 1547     Troponin T <0.010 ng/mL     Narrative:      Troponin T Reference Range:  <= 0.03  "ng/mL-   Negative for AMI  >0.03 ng/mL-     Abnormal for myocardial necrosis.  Clinicians would have to utilize clinical acumen, EKG, Troponin and serial changes to determine if it is an Acute Myocardial Infarction or myocardial injury due to an underlying chronic condition.       Results may be falsely decreased if patient taking Biotin.      Blood Culture - Blood, Arm, Right [[id number]] Collected: 04/02/21 1513    Specimen: Blood from Arm, Right Updated: 04/02/21 1518    Procalcitonin [[id number]]  (Normal) Collected: 04/02/21 1513    Specimen: Blood Updated: 04/02/21 1554     Procalcitonin 0.08 ng/mL     Narrative:      As a Marker for Sepsis (Non-Neonates):   1. <0.5 ng/mL represents a low risk of severe sepsis and/or septic shock.  1. >2 ng/mL represents a high risk of severe sepsis and/or septic shock.    As a Marker for Lower Respiratory Tract Infections that require antibiotic therapy:  PCT on Admission     Antibiotic Therapy             6-12 Hrs later  > 0.5                Strongly Recommended            >0.25 - <0.5         Recommended  0.1 - 0.25           Discouraged                   Remeasure/reassess PCT  <0.1                 Strongly Discouraged          Remeasure/reassess PCT      As 28 day mortality risk marker: \"Change in Procalcitonin Result\" (> 80 % or <=80 %) if Day 0 (or Day 1) and Day 4 values are available. Refer to http://www.[employer]s-pct-calculator.com/   Change in PCT <=80 %   A decrease of PCT levels below or equal to 80 % defines a positive change in PCT test result representing a higher risk for 28-day all-cause mortality of patients diagnosed with severe sepsis or septic shock.  Change in PCT > 80 %   A decrease of PCT levels of more than 80 % defines a negative change in PCT result representing a lower risk for 28-day all-cause mortality of patients diagnosed with severe sepsis or septic shock.                Results may be falsely decreased if patient taking Biotin.     Magnesium " [[id number]]  (Normal) Collected: 04/02/21 1513    Specimen: Blood Updated: 04/02/21 1547     Magnesium 1.9 mg/dL     CBC Auto Differential [[id number]]  (Abnormal) Collected: 04/02/21 1513    Specimen: Blood Updated: 04/02/21 1532     WBC 4.62 10*3/mm3      RBC 4.24 10*6/mm3      Hemoglobin 11.9 g/dL      Hematocrit 37.4 %      MCV 88.2 fL      MCH 28.1 pg      MCHC 31.8 g/dL      RDW 13.8 %      RDW-SD 44.3 fl      MPV 11.6 fL      Platelets 151 10*3/mm3      Neutrophil % 53.8 %      Lymphocyte % 26.8 %      Monocyte % 12.8 %      Eosinophil % 5.8 %      Basophil % 0.4 %      Immature Grans % 0.4 %      Neutrophils, Absolute 2.48 10*3/mm3      Lymphocytes, Absolute 1.24 10*3/mm3      Monocytes, Absolute 0.59 10*3/mm3      Eosinophils, Absolute 0.27 10*3/mm3      Basophils, Absolute 0.02 10*3/mm3      Immature Grans, Absolute 0.02 10*3/mm3      nRBC 0.0 /100 WBC     Urinalysis With Culture If Indicated - Urine, Catheter [[id number]]  (Abnormal) Collected: 04/02/21 1735    Specimen: Urine, Catheter Updated: 04/02/21 1752     Color, UA Yellow     Appearance, UA Cloudy     pH, UA 7.5     Specific Gravity, UA 1.008     Glucose, UA Negative     Ketones, UA Negative     Bilirubin, UA Negative     Blood, UA Small (1+)     Protein, UA 30 mg/dL (1+)     Leuk Esterase, UA Trace     Nitrite, UA Negative     Urobilinogen, UA 1.0 E.U./dL    Urinalysis, Microscopic Only - Urine, Catheter [[id number]]  (Abnormal) Collected: 04/02/21 1735    Specimen: Urine, Catheter Updated: 04/02/21 1808     RBC, UA None Seen /HPF      WBC, UA 0-2 /HPF      Bacteria, UA 1+ /HPF      Squamous Epithelial Cells, UA None Seen /HPF      Hyaline Casts, UA None Seen /LPF      Methodology Manual Light Microscopy    Blood Culture - Blood, Arm, Left [[id number]] Collected: 04/02/21 1824    Specimen: Blood from Arm, Left Updated: 04/02/21 1827          XR Chest 1 View   Final Result        Assessment/Plan      Active Hospital Problems    Diagnosis  POA   •  **Edema of left upper extremity [R60.0]  Yes   • HTN (hypertension) [I10]  Yes   • CHF (congestive heart failure) (CMS/HCC) [I50.9]  Yes   • Hyperlipidemia [E78.5]  Yes   • Diabetes mellitus type 2, uncontrolled, with complications (CMS/HCC) [E11.8, E11.65]  Yes   • PVD (peripheral vascular disease) (CMS/HCC) [I73.9]  Yes   • Morbid obesity (CMS/[facility]) [E66.01]  Yes      Resolved Hospital Problems   No resolved problems to display.     Edema LUE/CHF  -likely due to the fact she cut her home Bumex dose in half several weeks ago  -doppler negative  -continue pain medication PRN LUE pain  -cardiology consult  -given Bumex in ED, will repeat dose in 12 hrs and monitor output  -daily weights and strict I&O    DM2  -hold home dose Glipizide and start moderate dose correctional factor insulin for meals  -a1c in AM    HTN/HLD  -stable, may continue home medications    VTE Ppx  -SCDs    CODE status  -DNR    I discussed the patients findings and my recommendations with patient.    IVAN Salazar  [facility]  04/02/21  6:16 PM EDT

## 2021-04-02 NOTE — ED PROVIDER NOTES
Subjective   65-year-old female with past medical history of CHF, right foot gangrene status post resection, hyperlipidemia, hypertension, peripheral artery disease, type 2 diabetes here for chief complaint of left-sided swelling.  History is obtained from the patient.  The patient states that she has left hand, leg swelling.  She lives in a facility and was sent here for further management.  Patient states that she does have swelling in her abdomen as well.  She states that she lies towards the left side but not completely on that side.  She complains of chest pain that is chronic and not new and she states that this is because she has a bad heart.  She denies any shortness of breath.  Patient denies any fevers or chills.  She denies any cough.  She denies any abdominal pain.  She denies any nausea, vomiting, diarrhea, or any other symptoms.  She denies any urinary symptoms.  She states that she has been on Bumex but this is not helping.          Review of Systems   All other systems reviewed and are negative.      Past Medical History:   Diagnosis Date   • CHF (congestive heart failure) (CMS/HCC)    • Gangrene of toe of right foot (CMS/HCC)    • Hyperlipidemia    • Hypertension    • PAD (peripheral artery disease) (CMS/HCC)    • Peripheral vascular disease (CMS/HCC)    • Tobacco abuse    • Type 2 diabetes mellitus (CMS/HCC)        Allergies   Allergen Reactions   • Dilaudid [Hydromorphone] Unknown - Low Severity   • Morphine Nausea And Vomiting       Past Surgical History:   Procedure Laterality Date   • APPENDECTOMY  2003   • FEMORAL ARTERY STENT     • FOOT SURGERY     • HYSTERECTOMY  2002   • TOE AMPUTATION Right        History reviewed. No pertinent family history.    Social History     Socioeconomic History   • Marital status: Single     Spouse name: Not on file   • Number of children: Not on file   • Years of education: Not on file   • Highest education level: Not on file   Tobacco Use   • Smoking status:  Current Every Day Smoker     Packs/day: 1.00     Years: 50.00     Pack years: 50.00     Types: Cigarettes   • Smokeless tobacco: Never Used   • Tobacco comment: daily caffiene   Vaping Use   • Vaping Use: Never used   Substance and Sexual Activity   • Alcohol use: No   • Drug use: No   • Sexual activity: Defer           Objective   Physical Exam  Vitals reviewed.   Constitutional:       General: She is not in acute distress.     Appearance: She is not toxic-appearing or diaphoretic.      Comments: Chronically sick appearing   HENT:      Head: Normocephalic and atraumatic.      Right Ear: External ear normal.      Left Ear: External ear normal.      Nose: Nose normal. No congestion or rhinorrhea.      Mouth/Throat:      Mouth: Mucous membranes are moist.      Pharynx: Oropharynx is clear. No oropharyngeal exudate or posterior oropharyngeal erythema.   Eyes:      General: No scleral icterus.        Right eye: No discharge.         Left eye: No discharge.      Extraocular Movements: Extraocular movements intact.      Conjunctiva/sclera: Conjunctivae normal.      Pupils: Pupils are equal, round, and reactive to light.   Cardiovascular:      Rate and Rhythm: Normal rate and regular rhythm.      Pulses: Normal pulses.      Heart sounds: Normal heart sounds. No murmur heard.   No friction rub. No gallop.    Pulmonary:      Effort: Pulmonary effort is normal. No respiratory distress.      Breath sounds: Normal breath sounds. No stridor. No wheezing, rhonchi or rales.   Chest:      Chest wall: No tenderness.   Abdominal:      General: Abdomen is flat. Bowel sounds are normal. There is distension.      Palpations: There is no mass.      Tenderness: There is no abdominal tenderness. There is no right CVA tenderness, left CVA tenderness, guarding or rebound.      Hernia: No hernia is present.   Musculoskeletal:         General: No swelling, tenderness, deformity or signs of injury. Normal range of motion.      Cervical back:  Normal range of motion and neck supple. No rigidity or tenderness.      Left lower leg: Edema present.      Comments: Right below the knee amputation, swelling of the left upper extremity, soft compartments in left upper and lower extremities   Skin:     General: Skin is warm and dry.      Capillary Refill: Capillary refill takes less than 2 seconds.      Coloration: Skin is not jaundiced or pale.   Neurological:      Mental Status: She is alert and oriented to person, place, and time.      Sensory: No sensory deficit.   Psychiatric:         Mood and Affect: Mood normal.         Behavior: Behavior normal.         Procedures           ED Course  ED Course as of Apr 04 1713   Fri Apr 02, 2021   1540 EKG          EKG time: 1528  Rhythm/Rate: 1, atrial fib  P waves and VA: Not applicable   QRS, axis: Normal QRS, normal axis, PVCs noted  ST and T waves: No acute ST or T wave abnormalities    Interpreted Contemporaneously by me, independently viewed  No prior available for comparison      [EW]   1730 Per [place] and vascular the left upper extremity venous Doppler is negative for DVT.  Additionally, the CHERYL is within normal limits.    [EW]   1754 Discussed admission with Dr. Crockett, [facility].  Agrees to admit to hospital, monitored bed, telemetry    [EW]      ED Course User Index  [EW] NedPatsy sandoval, APRN                                           MDM  Number of Diagnoses or Management Options     Amount and/or Complexity of Data Reviewed  Clinical lab tests: ordered and reviewed  Tests in the radiology section of CPT®: ordered and reviewed    Risk of Complications, Morbidity, and/or Mortality  General comments: When I first saw the patient, the patient appeared nontoxic.  Patient was stable.  Patient did complain of swelling in the left upper extremity and left lower extremity.  She also complained of swelling in her abdomen.  I did not think this was dependent edema.  We did get a DVT scan that was negative.  Patient's  BNP was elevated.  Given that she had swelling in all areas of her body, I am concerned that this could be CHF exacerbation.  Patient was given 1 dose of Bumex that she normally takes at home orally.  Patient was then admitted to the hospitalist for further management.  I defer all further management of this patient to the inpatient hospitalist.  We did get blood cultures that are currently pending.  The patient is stable in the ED and is admitted to the hospitalist.        Final diagnoses:   Edema of left upper extremity   History of chronic CHF       ED Disposition  ED Disposition     ED Disposition Condition Comment    Decision to Admit  Level of Care: Telemetry [5]   Diagnosis: Edema of left upper extremity [3000027]   Admitting Physician: DIONICIO MCELROY [[id number]]            No follow-up provider specified.       Medication List      No changes were made to your prescriptions during this visit.          Rogelio Downing MD  04/04/21 3952

## 2021-04-02 NOTE — ED PROVIDER NOTES
EMERGENCY DEPARTMENT ENCOUNTER    Room Number:  KODI/KODI  Date seen:  4/2/2021  Time seen: 14:46 EDT  PCP: Provider, No Known  Historian: Patient, nursing home records    HPI:  Chief complaint:  Left side of body edema  A complete HPI/ROS/PMH/PSH/SH/FH are unobtainable due to: n/a  Context:Glenis Anderson is a 65 y.o. female under [facility] care, who presents to the ED with c/o at least 6 days of left upper extremity edema that is extended into her anterior chest, left breast and left abdomen.  She states everything seemed to have started 6 weeks ago when she used her bilateral upper extremities to pull herself up in the bed at the nursing home; at that time she had some left shoulder pain.  She states that in the past week she has had a ultrasound to rule out blood clot of the left upper extremity remedy and they have but also increased her Bumex.  She has a history of congestive heart failure but denies any change in her breathing patterns or chest pain.  The only other thing that she adds to the story is that she does tend to lay more with her left side down in the bed but other than that she has never had this problem before.  She denies fever or chills or loss of sensation.  She does have some pain in the left upper extremity but states this is  not new    Patient was placed in face mask in first look. Patient was wearing facemask when I entered the room and throughout our encounter. I wore full protective equipment throughout this patient encounter including a face mask, eye shield and gloves. Hand hygiene/washing of hands was performed before donning protective equipment and after removal when leaving the room.    MEDICAL RECORD REVIEW  Patient has a past medical history significant for congestive heart failure, diabetes, hyperlipidemia, hypertension and PVD      From a [facility] discharge summary dated 11/3/2020:  Reason for Admission / Hospital Course     Glenis Anderson is a 65 yr/o female who was admitted  to [facility] 10/23/20 with dyspnea and volume overload (records reviewed). She was found to be in afib with RVR requiring cardioversion followed by SVT. An echocardiogram showed an EF of 16% with severe RV enlargement and TVR. Placement of [place] catheter during right heart cath further defined her cardiogenic shock. Her lactate was 2.61, BNP was 965, and troponin was 1.82. She was felt to be having a demand NSTEMI due to acute heart failure. Pt had been non-compliant with DAPT since a PCI 6/2020 for some weeks prior. After initial stabilization the plan was discussed including lifestyle modifications, advanced heart failure clinic follow up, and ICD placement consideration. Pt reported that she was not interested in those interventions and would rather focus on comfort and quality of life. She was evaluated by hospice and admitted 10/30/20. She transferred to the [facility] on 11/3/2020 for continued comfort care.    At the time of admission to the [facility] the pt had a Palliative Performance Score of 30%. Prognosis was judged to be days to weeks with a significant possibility of sudden cardiac death as well.     The patient had been receiving parenteral fentanyl for pain prior to transfer which was continued and titrated during her [facility] admission. She advised that she had previously taken gabapentin which was resumed at 100 mg q 8 hours with good results. As the pt appeared to stabilize, she was transitioned from parenteral to transdermal fentanyl. She did have issues with nausea which improved with addition of routine haloperidol. Oxycodone was ordered prn for breakthrough pain; ativan was initially ordered prn then scheduled for anxiety.    Symptoms have been well managed, and the pt has continued to stabilize over the course of her admission. Prior to her most recent hospitalization she had been living alone but understood that if was no longer safe for her to do so and was agreeable to LTC  placement. She will be transferred to [facility] [place]. [facility] will continue to follow.     Condition on Discharge     1. Functional Status: moderately impaired  Describe limitations: PPS 40. Pt is independent of feeding and able to transfer to [facility] with assistance. Dependent upon all other ADLs.   2. Mental Status: cognitively intact  3. Diet / Tube Feeding / TPN: regular  4. Respiratory / Lines & Tubes / Wounds: O2 at 2 liters per NC prn    Pt remains DNR.     Disposition     Discharge to [facility] and will continue with [facility] Care.     Attending Physician will be Elio Jin MD       ALLERGIES  Dilaudid [hydromorphone] and Morphine    PAST MEDICAL HISTORY  Active Ambulatory Problems     Diagnosis Date Noted   • Hyperlipidemia    • Diabetes mellitus type 2, uncontrolled, with complications (CMS/[facility]) 04/12/2017   • MELO (dyspnea on exertion) 04/11/2017   • Morbid obesity (CMS/[facility]) 04/11/2017   • Noncompliance 03/29/2018   • Tobacco abuse 04/11/2017   • Wound infection 04/19/2018   • PVD (peripheral vascular disease) (CMS/[facility]) 04/11/2017   • Gangrene of toe of right foot (CMS/[facility]) 05/29/2019     Resolved Ambulatory Problems     Diagnosis Date Noted   • No Resolved Ambulatory Problems     Past Medical History:   Diagnosis Date   • CHF (congestive heart failure) (CMS/[facility])    • Hypertension    • PAD (peripheral artery disease) (CMS/[facility])    • Peripheral vascular disease (CMS/[facility])    • Type 2 diabetes mellitus (CMS/[facility])        PAST SURGICAL HISTORY  Past Surgical History:   Procedure Laterality Date   • APPENDECTOMY  2003   • FEMORAL ARTERY STENT     • FOOT SURGERY     • HYSTERECTOMY  2002   • TOE AMPUTATION Right        FAMILY HISTORY  No family history on file.    SOCIAL HISTORY  Social History     Socioeconomic History   • Marital status: Single     Spouse name: Not on file   • Number of children: Not on file   • Years of education: Not on file   • Highest education level: Not on file    Tobacco Use   • Smoking status: Current Every Day Smoker     Packs/day: 1.00     Years: 50.00     Pack years: 50.00     Types: Cigarettes   • Smokeless tobacco: Never Used   • Tobacco comment: daily caffiene   Substance and Sexual Activity   • Alcohol use: No   • Drug use: No   • Sexual activity: Defer       REVIEW OF SYSTEMS  Review of Systems    All systems reviewed and negative except for those discussed in HPI.     PHYSICAL EXAM    ED Triage Vitals [04/02/21 1417]   Temp Heart Rate Resp BP SpO2   96.8 °F (36 °C) 84 18 135/81 99 %      Temp src Heart Rate Source Patient Position BP Location FiO2 (%)   Tympanic -- -- -- --     Physical Exam    I have reviewed the triage vital signs and nursing notes.      GENERAL: not distressed  HENT: nares patent, mucous membranes moist  EYES: no scleral icterus  NECK: no ROM limitations  CV: regular rhythm, regular rate  RESPIRATORY: normal effort, able to speak in full sentences.  Left anterior and posterior chest have crackles throughout, otherwise breath sounds are normal.  The left breast has some pitting edema that extends into her upper chest and beneath the breast.  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  NEURO: alert, moves all extremities, follows commands  SKIN: warm, dry    LAB RESULTS  Recent Results (from the past 24 hour(s))   Comprehensive Metabolic Panel    Collection Time: 04/02/21  3:13 PM    Specimen: Blood   Result Value Ref Range    Glucose 115 (H) 65 - 99 mg/dL    BUN 15 8 - 23 mg/dL    Creatinine 0.74 0.57 - 1.00 mg/dL    Sodium 140 136 - 145 mmol/L    Potassium 3.6 3.5 - 5.2 mmol/L    Chloride 95 (L) 98 - 107 mmol/L    CO2 35.2 (H) 22.0 - 29.0 mmol/L    Calcium 8.5 (L) 8.6 - 10.5 mg/dL    Total Protein 6.4 6.0 - 8.5 g/dL    Albumin 3.60 3.50 - 5.20 g/dL    ALT (SGPT) 8 1 - 33 U/L    AST (SGOT) 14 1 - 32 U/L    Alkaline Phosphatase  198 (H) 39 - 117 U/L    Total Bilirubin 1.3 (H) 0.0 - 1.2 mg/dL    eGFR Non African Amer 78 >60 mL/min/1.73    Globulin 2.8 gm/dL    A/G Ratio 1.3 g/dL    BUN/Creatinine Ratio 20.3 7.0 - 25.0    Anion Gap 9.8 5.0 - 15.0 mmol/L   Protime-INR    Collection Time: 04/02/21  3:13 PM    Specimen: Blood   Result Value Ref Range    Protime 17.2 (H) 11.7 - 14.2 Seconds    INR 1.42 (H) 0.90 - 1.10   BNP    Collection Time: 04/02/21  3:13 PM    Specimen: Blood   Result Value Ref Range    proBNP 2,873.0 (H) 0.0 - 900.0 pg/mL   Troponin    Collection Time: 04/02/21  3:13 PM    Specimen: Blood   Result Value Ref Range    Troponin T <0.010 0.000 - 0.030 ng/mL   Procalcitonin    Collection Time: 04/02/21  3:13 PM    Specimen: Blood   Result Value Ref Range    Procalcitonin 0.08 0.00 - 0.25 ng/mL   Magnesium    Collection Time: 04/02/21  3:13 PM    Specimen: Blood   Result Value Ref Range    Magnesium 1.9 1.6 - 2.4 mg/dL   CBC Auto Differential    Collection Time: 04/02/21  3:13 PM    Specimen: Blood   Result Value Ref Range    WBC 4.62 3.40 - 10.80 10*3/mm3    RBC 4.24 3.77 - 5.28 10*6/mm3    Hemoglobin 11.9 (L) 12.0 - 15.9 g/dL    Hematocrit 37.4 34.0 - 46.6 %    MCV 88.2 79.0 - 97.0 fL    MCH 28.1 26.6 - 33.0 pg    MCHC 31.8 31.5 - 35.7 g/dL    RDW 13.8 12.3 - 15.4 %    RDW-SD 44.3 37.0 - 54.0 fl    MPV 11.6 6.0 - 12.0 fL    Platelets 151 140 - 450 10*3/mm3    Neutrophil % 53.8 42.7 - 76.0 %    Lymphocyte % 26.8 19.6 - 45.3 %    Monocyte % 12.8 (H) 5.0 - 12.0 %    Eosinophil % 5.8 0.3 - 6.2 %    Basophil % 0.4 0.0 - 1.5 %    Immature Grans % 0.4 0.0 - 0.5 %    Neutrophils, Absolute 2.48 1.70 - 7.00 10*3/mm3    Lymphocytes, Absolute 1.24 0.70 - 3.10 10*3/mm3    Monocytes, Absolute 0.59 0.10 - 0.90 10*3/mm3    Eosinophils, Absolute 0.27 0.00 - 0.40 10*3/mm3    Basophils, Absolute 0.02 0.00 - 0.20 10*3/mm3    Immature Grans, Absolute 0.02 0.00 - 0.05 10*3/mm3    nRBC 0.0 0.0 - 0.2 /100 WBC   ECG 12 Lead    Collection Time: 04/02/21   3:28 PM   Result Value Ref Range    QT Interval 380 ms   Urinalysis With Culture If Indicated - Urine, Catheter    Collection Time: 04/02/21  5:35 PM    Specimen: Urine, Catheter   Result Value Ref Range    Color, UA Yellow Yellow, Straw    Appearance, UA Cloudy (A) Clear    pH, UA 7.5 5.0 - 8.0    Specific Gravity, UA 1.008 1.005 - 1.030    Glucose, UA Negative Negative    Ketones, UA Negative Negative    Bilirubin, UA Negative Negative    Blood, UA Small (1+) (A) Negative    Protein, UA 30 mg/dL (1+) (A) Negative    Leuk Esterase, UA Trace (A) Negative    Nitrite, UA Negative Negative    Urobilinogen, UA 1.0 E.U./dL 0.2 - 1.0 E.U./dL   Urinalysis, Microscopic Only - Urine, Catheter    Collection Time: 04/02/21  5:35 PM    Specimen: Urine, Catheter   Result Value Ref Range    RBC, UA None Seen None Seen, 0-2 /HPF    WBC, UA 0-2 None Seen, 0-2 /HPF    Bacteria, UA 1+ (A) None Seen /HPF    Squamous Epithelial Cells, UA None Seen None Seen, 0-2 /HPF    Hyaline Casts, UA None Seen None Seen /LPF    Methodology Manual Light Microscopy    Duplex Venous Upper Extremity - Left CAR    Collection Time: 04/02/21  6:08 PM   Result Value Ref Range    Left Internal Jugular Augment Y     Left Internal Jugular Competent Y     Left Internal Jugular Compress C     Left Internal Jugular Phasic Y     Left Internal Jugular Spont Y     Left Subclavian Augment Y     Left Subclavian Competent Y     Left Subclavian Compress C     Left Subclavian Phasic Y     Left Subclavian Spont Y     Left Axillary Augment Y     Left Axillary Competent Y     Left Axillary Compress C     Left Axillary Phasic Y     Left Axillary Spont Y     Left Brachial Compress C     Left Radial Compress C     Left Ulnar Compress C     Left Basilic Upper Compress C     Left Basilic Forearm Compress C     Left Cephalic Upper Compress C     Left Cephalic Forearm Compress C    Doppler Arterial Single Level Upper Extremity - Bilateral CAR    Collection Time: 04/02/21  6:10 PM    Result Value Ref Range    RIGHT 2ND DIGIT SYS  mmHg    LEFT 2ND DIGIT SYS  mmHg    Upper arterial right arm brachial sys max 130 mmHg    Upper arterial right arm radial sys max 155 mmHg    Upper arterial right arm ulnar sys max 156 mmHg    Upper arterial left arm brachial sys max 146 mmHg    Upper arterial left arm radial sys max 158 mmHg    Upper arterial left arm ulnar sys max 163 mmHg    LEFT WBI RATIO 1.1     LEFT FBI 1ST DIGIT RATIO 0.97     LEFT FBI 2ND DIGIT RATIO 141     RIGHT WBI RATIO 1.1     RIGHT FBI 1ST DIGIT RATIO 0.81     RIGHT FBI 2ND DIGIT RATIO 118          RADIOLOGY RESULTS  XR Chest 1 View   Final Result            PROGRESS, DATA ANALYSIS, CONSULTS AND MEDICAL DECISION MAKING  All labs have been independently reviewed by me.  All radiology studies have been reviewed by me and discussed with radiologist dictating the report.  EKG's independently viewed and interpreted by me unless stated otherwise. Discussion below represents my analysis of pertinent findings related to patient's condition, differential diagnosis, treatment plan and final disposition.     ED Course as of Apr 02 1959 Fri Apr 02, 2021   1540 EKG          EKG time: 1528  Rhythm/Rate: 1, atrial fib  P waves and GA: Not applicable   QRS, axis: Normal QRS, normal axis, PVCs noted  ST and T waves: No acute ST or T wave abnormalities    Interpreted Contemporaneously by me, independently viewed  No prior available for comparison      [EW]   1730 Per [place] and vascular the left upper extremity venous Doppler is negative for DVT.  Additionally, the CHERYL is within normal limits.    [EW]   1754 Discussed admission with Dr. Crockett [facility].  Agrees to admit to hospital, monitored bed, telemetry    [EW]      ED Course User Index  [EW] Patsy Marino, APRN     DDX: SVC syndrome, dependent edema, CHF, DVT (LUE), breast cancer    MDM:   There is no arterial or venous blood clot in the left upper extremity.  The patient does have  "a more elevated than normal BNP concerning for exacerbation of her congestive heart failure.  I did order a dose of Bumex here in the emergency department.  She has no shortness of breath complaints nor chest pain.  She will be admitted for further work-up.       Reviewed pt's history and workup with Dr. Downing.  After a bedside evaluation, Dr. Downing agrees with the plan of care.     Based on the patient's lab findings and presenting symptoms, the doctor and I feel it is appropriate to admit the patient for further management, evaluation, and treatment.  I have discussed this with the admitting team.  I have also discussed this with the patient/family.  They are in agreement with admission.          Disposition vitals:  /81   Pulse 84   Temp 96.8 °F (36 °C) (Tympanic)   Resp 18   Ht 167.6 cm (66\")   SpO2 99%   BMI 43.84 kg/m²       DIAGNOSIS  Final diagnoses:   Edema of left upper extremity   History of chronic CHF       Admission     Patsy Marino APRN  04/02/21 1809       Patsy Marino APRN  04/02/21 1959    "

## 2021-04-03 ENCOUNTER — APPOINTMENT (OUTPATIENT)
Dept: CARDIOLOGY | Facility: HOSPITAL | Age: 66
End: 2021-04-03

## 2021-04-03 PROBLEM — Z51.5 ENCOUNTER FOR PALLIATIVE CARE: Status: ACTIVE | Noted: 2021-04-03

## 2021-04-03 LAB
ANION GAP SERPL CALCULATED.3IONS-SCNC: 10.4 MMOL/L (ref 5–15)
BACTERIA BLD CULT: NORMAL
BASOPHILS # BLD AUTO: 0.02 10*3/MM3 (ref 0–0.2)
BASOPHILS NFR BLD AUTO: 0.3 % (ref 0–1.5)
BH CV LOWER VASCULAR LEFT COMMON FEMORAL AUGMENT: NORMAL
BH CV LOWER VASCULAR LEFT COMMON FEMORAL COMPETENT: NORMAL
BH CV LOWER VASCULAR LEFT COMMON FEMORAL COMPRESS: NORMAL
BH CV LOWER VASCULAR LEFT COMMON FEMORAL PHASIC: NORMAL
BH CV LOWER VASCULAR LEFT COMMON FEMORAL SPONT: NORMAL
BH CV LOWER VASCULAR LEFT DISTAL FEMORAL COMPRESS: NORMAL
BH CV LOWER VASCULAR LEFT GASTRONEMIUS COMPRESS: NORMAL
BH CV LOWER VASCULAR LEFT GREATER SAPH AK COMPRESS: NORMAL
BH CV LOWER VASCULAR LEFT GREATER SAPH BK COMPRESS: NORMAL
BH CV LOWER VASCULAR LEFT LESSER SAPH COMPRESS: NORMAL
BH CV LOWER VASCULAR LEFT MID FEMORAL AUGMENT: NORMAL
BH CV LOWER VASCULAR LEFT MID FEMORAL COMPETENT: NORMAL
BH CV LOWER VASCULAR LEFT MID FEMORAL COMPRESS: NORMAL
BH CV LOWER VASCULAR LEFT MID FEMORAL PHASIC: NORMAL
BH CV LOWER VASCULAR LEFT MID FEMORAL SPONT: NORMAL
BH CV LOWER VASCULAR LEFT PERONEAL COMPRESS: NORMAL
BH CV LOWER VASCULAR LEFT POPLITEAL AUGMENT: NORMAL
BH CV LOWER VASCULAR LEFT POPLITEAL COMPETENT: NORMAL
BH CV LOWER VASCULAR LEFT POPLITEAL COMPRESS: NORMAL
BH CV LOWER VASCULAR LEFT POPLITEAL PHASIC: NORMAL
BH CV LOWER VASCULAR LEFT POPLITEAL SPONT: NORMAL
BH CV LOWER VASCULAR LEFT POSTERIOR TIBIAL COMPRESS: NORMAL
BH CV LOWER VASCULAR LEFT PROFUNDA FEMORAL COMPRESS: NORMAL
BH CV LOWER VASCULAR LEFT PROXIMAL FEMORAL COMPRESS: NORMAL
BH CV LOWER VASCULAR LEFT SAPHENOFEMORAL JUNCTION COMPRESS: NORMAL
BH CV LOWER VASCULAR RIGHT COMMON FEMORAL AUGMENT: NORMAL
BH CV LOWER VASCULAR RIGHT COMMON FEMORAL COMPETENT: NORMAL
BH CV LOWER VASCULAR RIGHT COMMON FEMORAL COMPRESS: NORMAL
BH CV LOWER VASCULAR RIGHT COMMON FEMORAL PHASIC: NORMAL
BH CV LOWER VASCULAR RIGHT COMMON FEMORAL SPONT: NORMAL
BH CV UPPER ARTERIAL LEFT 2ND DIGIT SYS MAX: 141 MMHG
BH CV UPPER ARTERIAL LEFT FBI 1ST DIGIT RATIO: 0.97
BH CV UPPER ARTERIAL LEFT FBI 2ND DIGIT RATIO: 141
BH CV UPPER ARTERIAL LEFT WBI RATIO: 1.1
BH CV UPPER ARTERIAL RIGHT 2ND DIGIT SYS MAX: 118 MMHG
BH CV UPPER ARTERIAL RIGHT FBI 1ST DIGIT RATIO: 0.81
BH CV UPPER ARTERIAL RIGHT FBI 2ND DIGIT RATIO: 118
BH CV UPPER ARTERIAL RIGHT WBI RATIO: 1.1
BH CV UPPER VENOUS LEFT AXILLARY AUGMENT: NORMAL
BH CV UPPER VENOUS LEFT AXILLARY COMPETENT: NORMAL
BH CV UPPER VENOUS LEFT AXILLARY COMPRESS: NORMAL
BH CV UPPER VENOUS LEFT AXILLARY PHASIC: NORMAL
BH CV UPPER VENOUS LEFT AXILLARY SPONT: NORMAL
BH CV UPPER VENOUS LEFT BASILIC FOREARM COMPRESS: NORMAL
BH CV UPPER VENOUS LEFT BASILIC UPPER COMPRESS: NORMAL
BH CV UPPER VENOUS LEFT BRACHIAL COMPRESS: NORMAL
BH CV UPPER VENOUS LEFT CEPHALIC FOREARM COMPRESS: NORMAL
BH CV UPPER VENOUS LEFT CEPHALIC UPPER COMPRESS: NORMAL
BH CV UPPER VENOUS LEFT INTERNAL JUGULAR AUGMENT: NORMAL
BH CV UPPER VENOUS LEFT INTERNAL JUGULAR COMPETENT: NORMAL
BH CV UPPER VENOUS LEFT INTERNAL JUGULAR COMPRESS: NORMAL
BH CV UPPER VENOUS LEFT INTERNAL JUGULAR PHASIC: NORMAL
BH CV UPPER VENOUS LEFT INTERNAL JUGULAR SPONT: NORMAL
BH CV UPPER VENOUS LEFT RADIAL COMPRESS: NORMAL
BH CV UPPER VENOUS LEFT SUBCLAVIAN AUGMENT: NORMAL
BH CV UPPER VENOUS LEFT SUBCLAVIAN COMPETENT: NORMAL
BH CV UPPER VENOUS LEFT SUBCLAVIAN COMPRESS: NORMAL
BH CV UPPER VENOUS LEFT SUBCLAVIAN PHASIC: NORMAL
BH CV UPPER VENOUS LEFT SUBCLAVIAN SPONT: NORMAL
BH CV UPPER VENOUS LEFT ULNAR COMPRESS: NORMAL
BOTTLE TYPE: NORMAL
BUN SERPL-MCNC: 17 MG/DL (ref 8–23)
BUN/CREAT SERPL: 27.9 (ref 7–25)
CALCIUM SPEC-SCNC: 8.5 MG/DL (ref 8.6–10.5)
CHLORIDE SERPL-SCNC: 96 MMOL/L (ref 98–107)
CO2 SERPL-SCNC: 32.6 MMOL/L (ref 22–29)
CREAT SERPL-MCNC: 0.61 MG/DL (ref 0.57–1)
DEPRECATED RDW RBC AUTO: 43.4 FL (ref 37–54)
EOSINOPHIL # BLD AUTO: 0.12 10*3/MM3 (ref 0–0.4)
EOSINOPHIL NFR BLD AUTO: 1.9 % (ref 0.3–6.2)
ERYTHROCYTE [DISTWIDTH] IN BLOOD BY AUTOMATED COUNT: 13.7 % (ref 12.3–15.4)
GFR SERPL CREATININE-BSD FRML MDRD: 98 ML/MIN/1.73
GLUCOSE BLDC GLUCOMTR-MCNC: 108 MG/DL (ref 70–130)
GLUCOSE BLDC GLUCOMTR-MCNC: 109 MG/DL (ref 70–130)
GLUCOSE BLDC GLUCOMTR-MCNC: 110 MG/DL (ref 70–130)
GLUCOSE BLDC GLUCOMTR-MCNC: 128 MG/DL (ref 70–130)
GLUCOSE SERPL-MCNC: 113 MG/DL (ref 65–99)
HBA1C MFR BLD: 5.6 % (ref 4.8–5.6)
HCT VFR BLD AUTO: 38.5 % (ref 34–46.6)
HGB BLD-MCNC: 12.4 G/DL (ref 12–15.9)
IMM GRANULOCYTES # BLD AUTO: 0.02 10*3/MM3 (ref 0–0.05)
IMM GRANULOCYTES NFR BLD AUTO: 0.3 % (ref 0–0.5)
LYMPHOCYTES # BLD AUTO: 0.95 10*3/MM3 (ref 0.7–3.1)
LYMPHOCYTES NFR BLD AUTO: 14.9 % (ref 19.6–45.3)
MCH RBC QN AUTO: 28.1 PG (ref 26.6–33)
MCHC RBC AUTO-ENTMCNC: 32.2 G/DL (ref 31.5–35.7)
MCV RBC AUTO: 87.3 FL (ref 79–97)
MONOCYTES # BLD AUTO: 0.74 10*3/MM3 (ref 0.1–0.9)
MONOCYTES NFR BLD AUTO: 11.6 % (ref 5–12)
NEUTROPHILS NFR BLD AUTO: 4.51 10*3/MM3 (ref 1.7–7)
NEUTROPHILS NFR BLD AUTO: 71 % (ref 42.7–76)
NRBC BLD AUTO-RTO: 0 /100 WBC (ref 0–0.2)
PLATELET # BLD AUTO: 158 10*3/MM3 (ref 140–450)
PMV BLD AUTO: 11.4 FL (ref 6–12)
POTASSIUM SERPL-SCNC: 3.7 MMOL/L (ref 3.5–5.2)
RBC # BLD AUTO: 4.41 10*6/MM3 (ref 3.77–5.28)
SARS-COV-2 ORF1AB RESP QL NAA+PROBE: NOT DETECTED
SODIUM SERPL-SCNC: 139 MMOL/L (ref 136–145)
TROPONIN T SERPL-MCNC: <0.01 NG/ML (ref 0–0.03)
UPPER ARTERIAL LEFT ARM BRACHIAL SYS MAX: 146 MMHG
UPPER ARTERIAL LEFT ARM RADIAL SYS MAX: 158 MMHG
UPPER ARTERIAL LEFT ARM ULNAR SYS MAX: 163 MMHG
UPPER ARTERIAL RIGHT ARM BRACHIAL SYS MAX: 130 MMHG
UPPER ARTERIAL RIGHT ARM RADIAL SYS MAX: 155 MMHG
UPPER ARTERIAL RIGHT ARM ULNAR SYS MAX: 156 MMHG
WBC # BLD AUTO: 6.36 10*3/MM3 (ref 3.4–10.8)

## 2021-04-03 PROCEDURE — G0378 HOSPITAL OBSERVATION PER HR: HCPCS

## 2021-04-03 PROCEDURE — 82962 GLUCOSE BLOOD TEST: CPT

## 2021-04-03 PROCEDURE — 93971 EXTREMITY STUDY: CPT

## 2021-04-03 PROCEDURE — 99222 1ST HOSP IP/OBS MODERATE 55: CPT | Performed by: INTERNAL MEDICINE

## 2021-04-03 PROCEDURE — 80048 BASIC METABOLIC PNL TOTAL CA: CPT | Performed by: INTERNAL MEDICINE

## 2021-04-03 PROCEDURE — 85025 COMPLETE CBC W/AUTO DIFF WBC: CPT | Performed by: INTERNAL MEDICINE

## 2021-04-03 PROCEDURE — 84484 ASSAY OF TROPONIN QUANT: CPT | Performed by: INTERNAL MEDICINE

## 2021-04-03 PROCEDURE — 83036 HEMOGLOBIN GLYCOSYLATED A1C: CPT | Performed by: NURSE PRACTITIONER

## 2021-04-03 PROCEDURE — 36415 COLL VENOUS BLD VENIPUNCTURE: CPT | Performed by: NURSE PRACTITIONER

## 2021-04-03 RX ORDER — FENTANYL 25 UG/H
1 PATCH TRANSDERMAL
Status: DISCONTINUED | OUTPATIENT
Start: 2021-04-03 | End: 2021-04-05 | Stop reason: HOSPADM

## 2021-04-03 RX ORDER — LISINOPRIL 2.5 MG/1
2.5 TABLET ORAL
Status: DISCONTINUED | OUTPATIENT
Start: 2021-04-03 | End: 2021-04-04

## 2021-04-03 RX ORDER — CLONAZEPAM 0.5 MG/1
0.5 TABLET ORAL EVERY 4 HOURS PRN
Status: DISCONTINUED | OUTPATIENT
Start: 2021-04-03 | End: 2021-04-05 | Stop reason: HOSPADM

## 2021-04-03 RX ORDER — ATORVASTATIN CALCIUM 20 MG/1
40 TABLET, FILM COATED ORAL DAILY
Status: DISCONTINUED | OUTPATIENT
Start: 2021-04-03 | End: 2021-04-05 | Stop reason: HOSPADM

## 2021-04-03 RX ORDER — CYCLOBENZAPRINE HCL 10 MG
10 TABLET ORAL 3 TIMES DAILY PRN
Status: DISCONTINUED | OUTPATIENT
Start: 2021-04-03 | End: 2021-04-05 | Stop reason: HOSPADM

## 2021-04-03 RX ADMIN — ATORVASTATIN CALCIUM 40 MG: 20 TABLET, FILM COATED ORAL at 13:36

## 2021-04-03 RX ADMIN — SODIUM CHLORIDE, PRESERVATIVE FREE 10 ML: 5 INJECTION INTRAVENOUS at 13:36

## 2021-04-03 RX ADMIN — BUMETANIDE 2 MG: 0.25 INJECTION INTRAMUSCULAR; INTRAVENOUS at 20:45

## 2021-04-03 RX ADMIN — CYCLOBENZAPRINE 10 MG: 10 TABLET, FILM COATED ORAL at 20:45

## 2021-04-03 RX ADMIN — OXYCODONE HYDROCHLORIDE AND ACETAMINOPHEN 1 TABLET: 5; 325 TABLET ORAL at 20:45

## 2021-04-03 RX ADMIN — BUMETANIDE 2 MG: 0.25 INJECTION INTRAMUSCULAR; INTRAVENOUS at 06:39

## 2021-04-03 RX ADMIN — SODIUM CHLORIDE, PRESERVATIVE FREE 10 ML: 5 INJECTION INTRAVENOUS at 20:46

## 2021-04-03 RX ADMIN — LISINOPRIL 2.5 MG: 2.5 TABLET ORAL at 13:36

## 2021-04-03 RX ADMIN — CLONAZEPAM 0.5 MG: 0.5 TABLET ORAL at 20:45

## 2021-04-03 RX ADMIN — CLONAZEPAM 0.5 MG: 0.5 TABLET ORAL at 09:45

## 2021-04-03 RX ADMIN — FENTANYL 1 PATCH: 25 PATCH TRANSDERMAL at 09:45

## 2021-04-03 NOTE — PLAN OF CARE
Goal Outcome Evaluation:  Plan of Care Reviewed With: patient  Progress: no change  Outcome Summary: denies pain, VSS, bigeminy on monitor, asymptomatic, no complaints voiced, edema to left arm and leg, Eyes closed at long interval, resting quielty in room, will continue to monitor

## 2021-04-03 NOTE — PROGRESS NOTES
Name: Glenis Anderson ADMIT: 2021   : 1955  PCP: Provider, No Known    MRN: [id number] LOS: 0 days   AGE/SEX: 65 y.o. female  ROOM: [facility]/     Subjective   Subjective   She thinks her left-sided swelling is a little better.  Not much shortness of breath at present, not much of an appetite but she states that is not unusual    Review of Systems   Constitutional: Negative for fever.   Respiratory: Negative for cough.    Cardiovascular: Positive for leg swelling. Negative for chest pain.   Gastrointestinal: Negative for abdominal pain.      Objective   Objective   Vital Signs  Temp:  [96.8 °F (36 °C)-98.9 °F (37.2 °C)] 98.9 °F (37.2 °C)  Heart Rate:  [80-94] 80  Resp:  [18] 18  BP: (122-135)/(67-81) 128/67  SpO2:  [96 %-99 %] 97 %  on  Flow (L/min):  [2] 2;   Device (Oxygen Therapy): nasal cannula  Body mass index is 37.65 kg/m².    Physical Exam  Vitals and nursing note reviewed.   Constitutional:       General: She is not in acute distress.  HENT:      Head: Normocephalic and atraumatic.   Cardiovascular:      Rate and Rhythm: Normal rate and regular rhythm.   Pulmonary:      Effort: Pulmonary effort is normal. No respiratory distress.      Breath sounds: Normal breath sounds.   Abdominal:      General: Bowel sounds are normal.      Palpations: Abdomen is soft.      Tenderness: There is no abdominal tenderness. There is no guarding or rebound.   Musculoskeletal:      Left lower leg: Edema (2-3+ LUE and LLE) present.      Right Lower Extremity: Right leg is amputated above knee.   Skin:     General: Skin is warm and dry.   Neurological:      General: No focal deficit present.      Mental Status: She is alert and oriented to person, place, and time.   Psychiatric:         Mood and Affect: Mood normal.         Behavior: Behavior normal.     Results Review  I reviewed the patient's new clinical results.  Results from last 7 days   Lab Units 21  0530 21  1513   WBC 10*3/mm3 6.36 4.62    HEMOGLOBIN g/dL 12.4 11.9*   PLATELETS 10*3/mm3 158 151     Results from last 7 days   Lab Units 04/03/21  0530 04/02/21  1513   SODIUM mmol/L 139 140   POTASSIUM mmol/L 3.7 3.6   CHLORIDE mmol/L 96* 95*   CO2 mmol/L 32.6* 35.2*   BUN mg/dL 17 15   CREATININE mg/dL 0.61 0.74   GLUCOSE mg/dL 113* 115*     Estimated Creatinine Clearance: 84 mL/min (by C-G formula based on SCr of 0.61 mg/dL).    Results from last 7 days   Lab Units 04/02/21  1513   ALBUMIN g/dL 3.60   BILIRUBIN mg/dL 1.3*   ALK PHOS U/L 198*   AST (SGOT) U/L 14   ALT (SGPT) U/L 8     Results from last 7 days   Lab Units 04/03/21  0530 04/02/21  1513   CALCIUM mg/dL 8.5* 8.5*   ALBUMIN g/dL  --  3.60   MAGNESIUM mg/dL  --  1.9     Results from last 7 days   Lab Units 04/02/21  1513   PROCALCITONIN ng/mL 0.08     Hemoglobin A1C   Date/Time Value Ref Range Status   04/03/2021 0530 5.60 4.80 - 5.60 % Final     Glucose   Date/Time Value Ref Range Status   04/03/2021 1103 128 70 - 130 mg/dL Final   04/03/2021 0608 108 70 - 130 mg/dL Final   04/02/2021 2052 110 70 - 130 mg/dL Final       Scheduled Meds  atorvastatin, 40 mg, Oral, Daily  bumetanide, 2 mg, Intravenous, Q12H  fentaNYL, 1 patch, Transdermal, Q72H  insulin lispro, 0-9 Units, Subcutaneous, TID AC  lisinopril, 2.5 mg, Oral, Q24H  sodium chloride, 10 mL, Intravenous, Q12H    Continuous Infusions   PRN Meds  •  acetaminophen **OR** acetaminophen **OR** acetaminophen  •  clonazePAM  •  cyclobenzaprine  •  dextrose  •  dextrose  •  glucagon (human recombinant)  •  ondansetron  •  oxyCODONE-acetaminophen  •  [COMPLETED] Insert peripheral IV **AND** sodium chloride  •  sodium chloride     Diet  Diet Regular; Low Sodium, Cardiac, Consistent Carbohydrate     I personally reviewed images  Results for orders placed during the hospital encounter of 04/02/21    Duplex Venous Lower Extremity - Left CAR    Interpretation Summary  · Normal left lower extremity venous duplex scan.         Assessment/Plan      Active Hospital Problems    Diagnosis  POA   • **Edema of left upper extremity [R60.0]  Yes   • Encounter for palliative care [Z51.5]  Not Applicable   • HTN (hypertension) [I10]  Yes   • Acute on chronic systolic congestive heart failure (CMS/HCC) [I50.23]  Yes   • Hyperlipidemia [E78.5]  Yes   • Diabetes mellitus type 2, uncontrolled, with complications (CMS/[facility]) [E11.8, E11.65]  Yes   • PVD (peripheral vascular disease) (CMS/[facility]) [I73.9]  Yes   • Morbid obesity (CMS/[facility]) [E66.01]  Yes      Resolved Hospital Problems   No resolved problems to display.     65 y.o. female admitted with Edema of left upper extremity.    · History of CHF presents with left-sided edema  · Left arm and leg Dopplers negative for clot, small left pleural effusion with minimal atelectasis  · Continue IV diuresis, symptomatic treatment  · Probably due to patient reducing her Bumex  · Cardiology ordered echocardiogram  · Hypertension controlled  · Diabetes: A1c 5.60  · Body mass index is 37.65 kg/m².   · Paroxysmal atrial fibrillation not on anticoagulation  · DNR.  Hospice patient, wishes conservative treatment  · Discussed with patient and nursing staff  · Anticipate discharge to SNU facility tomorrow.    Jair Abdalla MD  [facility]  04/03/21  14:15 EDT    I wore protective equipment throughout this patient encounter including a face mask, gloves, and protective eyewear.  Hand hygiene was performed before donning protective equipment and after removal when leaving the room.

## 2021-04-03 NOTE — PLAN OF CARE
Pt has no c/o pain, Klonopin x1, tena cath care, turn Q2, doppler completed, vs stable, NAD      Problem: Adult Inpatient Plan of Care  Goal: Plan of Care Review  Outcome: Ongoing, Progressing  Goal: Patient-Specific Goal (Individualized)  Outcome: Ongoing, Progressing  Goal: Absence of Hospital-Acquired Illness or Injury  Outcome: Ongoing, Progressing  Intervention: Identify and Manage Fall Risk  Recent Flowsheet Documentation  Taken 4/3/2021 1400 by Sreina Caceres RN  Safety Promotion/Fall Prevention:   activity supervised   assistive device/personal items within reach   clutter free environment maintained   fall prevention program maintained   safety round/check completed   room organization consistent   nonskid shoes/slippers when out of bed  Taken 4/3/2021 1200 by Serina Caceres RN  Safety Promotion/Fall Prevention: patient off unit  Taken 4/3/2021 0945 by Serina Caceres RN  Safety Promotion/Fall Prevention:   activity supervised   assistive device/personal items within reach   clutter free environment maintained   fall prevention program maintained   room organization consistent   safety round/check completed   nonskid shoes/slippers when out of bed  Intervention: Prevent Skin Injury  Recent Flowsheet Documentation  Taken 4/3/2021 1600 by Serina Caceres RN  Body Position: sitting up in bed  Taken 4/3/2021 1400 by Serina Caceres RN  Body Position: side-lying, right  Skin Protection: adhesive use limited  Taken 4/3/2021 1230 by Serina Caceres RN  Body Position: sitting up in bed  Taken 4/3/2021 0945 by Serina Caceres RN  Body Position: tilted, right  Skin Protection: adhesive use limited  Intervention: Prevent and Manage VTE (venous thromboembolism) Risk  Recent Flowsheet Documentation  Taken 4/3/2021 0945 by Serina Caceres RN  VTE Prevention/Management:   bilateral   dorsiflexion/plantar flexion performed  Intervention: Prevent Infection  Recent Flowsheet Documentation  Taken 4/3/2021 1600 by  Serina Caceres RN  Infection Prevention: rest/sleep promoted  Taken 4/3/2021 1400 by Serina Caceres RN  Infection Prevention: rest/sleep promoted  Taken 4/3/2021 1230 by Serina Caceres RN  Infection Prevention: rest/sleep promoted  Taken 4/3/2021 0945 by Serina Caceres RN  Infection Prevention:   single patient room provided   rest/sleep promoted  Goal: Optimal Comfort and Wellbeing  Outcome: Ongoing, Progressing  Intervention: Provide Person-Centered Care  Recent Flowsheet Documentation  Taken 4/3/2021 1400 by Serina Caceres RN  Trust Relationship/Rapport: care explained  Taken 4/3/2021 0945 by Serina Caceres RN  Trust Relationship/Rapport:   care explained   thoughts/feelings acknowledged   reassurance provided   questions encouraged   questions answered   emotional support provided  Goal: Readiness for Transition of Care  Outcome: Ongoing, Progressing     Problem: Fall Injury Risk  Goal: Absence of Fall and Fall-Related Injury  Outcome: Ongoing, Progressing  Intervention: Identify and Manage Contributors to Fall Injury Risk  Recent Flowsheet Documentation  Taken 4/3/2021 1400 by Serina Caceres RN  Medication Review/Management: medications reviewed  Self-Care Promotion:   independence encouraged   BADL personal objects within reach   BADL personal routines maintained  Taken 4/3/2021 0945 by Serina Caceres RN  Medication Review/Management: medications reviewed  Self-Care Promotion:   independence encouraged   BADL personal objects within reach   BADL personal routines maintained  Intervention: Promote Injury-Free Environment  Recent Flowsheet Documentation  Taken 4/3/2021 1400 by Serina Caceres RN  Safety Promotion/Fall Prevention:   activity supervised   assistive device/personal items within reach   clutter free environment maintained   fall prevention program maintained   safety round/check completed   room organization consistent   nonskid shoes/slippers when out of bed  Taken 4/3/2021 1200 by  Caceres, Lace N, RN  Safety Promotion/Fall Prevention: patient off unit  Taken 4/3/2021 0945 by Serina Caceres RN  Safety Promotion/Fall Prevention:   activity supervised   assistive device/personal items within reach   clutter free environment maintained   fall prevention program maintained   room organization consistent   safety round/check completed   nonskid shoes/slippers when out of bed     Problem: Skin Injury Risk Increased  Goal: Skin Health and Integrity  Outcome: Ongoing, Progressing  Intervention: Optimize Skin Protection  Recent Flowsheet Documentation  Taken 4/3/2021 1600 by Serina Caceres RN  Head of Bed (HOB): HOB elevated  Taken 4/3/2021 1400 by Serina Caceres RN  Pressure Reduction Techniques: frequent weight shift encouraged  Head of Bed (HOB): HOB elevated  Pressure Reduction Devices: pressure-redistributing mattress utilized  Skin Protection: adhesive use limited  Taken 4/3/2021 1230 by Serina Caceres RN  Head of Bed (HOB): HOB elevated  Taken 4/3/2021 0945 by Serina Caceres RN  Pressure Reduction Techniques: frequent weight shift encouraged  Head of Bed (HOB): HOB elevated  Pressure Reduction Devices: pressure-redistributing mattress utilized  Skin Protection: adhesive use limited     Problem: Adjustment to Illness (Heart Failure)  Goal: Optimal Coping  Outcome: Ongoing, Progressing  Intervention: Support and Optimize Psychosocial Response to Chronic Illness  Recent Flowsheet Documentation  Taken 4/3/2021 1400 by Serina Caceres RN  Supportive Measures: active listening utilized  Family/Support System Care:   presence promoted   self-care encouraged  Taken 4/3/2021 0945 by Serina Caceres RN  Supportive Measures: active listening utilized  Family/Support System Care:   presence promoted   self-care encouraged     Problem: Arrhythmia/Dysrhythmia (Heart Failure)  Goal: Stable Heart Rate and Rhythm  Outcome: Ongoing, Progressing     Problem: Cardiac Output Decreased (Heart  Failure)  Goal: Optimal Cardiac Output  Outcome: Ongoing, Progressing  Intervention: Optimize Cardiac Output  Recent Flowsheet Documentation  Taken 4/3/2021 1400 by Serina Caceres RN  Environmental Support: calm environment promoted  Taken 4/3/2021 0945 by Serina Caceres RN  Environmental Support: calm environment promoted     Problem: Fluid Imbalance (Heart Failure)  Goal: Fluid Balance  Outcome: Ongoing, Progressing     Problem: Functional Ability Impaired (Heart Failure)  Goal: Optimal Functional Ability  Outcome: Ongoing, Progressing  Intervention: Optimize Functional Ability  Recent Flowsheet Documentation  Taken 4/3/2021 1600 by Serina Caceres RN  Activity Management: activity adjusted per tolerance  Taken 4/3/2021 1400 by Serina Caceres RN  Activity Management: activity adjusted per tolerance  Self-Care Promotion:   independence encouraged   BADL personal objects within reach   BADL personal routines maintained  Taken 4/3/2021 1230 by Serina Caceres RN  Activity Management: activity adjusted per tolerance  Taken 4/3/2021 0945 by Serina Caceres RN  Activity Management:   activity adjusted per tolerance   activity encouraged  Self-Care Promotion:   independence encouraged   BADL personal objects within reach   BADL personal routines maintained     Problem: Oral Intake Inadequate (Heart Failure)  Goal: Optimal Nutrition Intake  Outcome: Ongoing, Progressing     Problem: Respiratory Compromise (Heart Failure)  Goal: Effective Oxygenation and Ventilation  Outcome: Ongoing, Progressing  Intervention: Promote Airway Secretion Clearance  Recent Flowsheet Documentation  Taken 4/3/2021 0945 by Serina Caceres RN  Cough And Deep Breathing: done independently per patient     Problem: Sleep Disordered Breathing (Heart Failure)  Goal: Effective Breathing Pattern During Sleep  Outcome: Ongoing, Progressing   Goal Outcome Evaluation:

## 2021-04-03 NOTE — PROGRESS NOTES
[facility] Visit Report    Glenis Anderson  [id number]  4/3/2021    Admission R/T [facility] Dx: yes    Reason for [facility] Admission:Hypertensive heart with heart failure    Symptom  Management: edema of left arm    Nursing/Medication Recommendations:nothing at this time, please call [facility] at [phone] with any needs, issues or questions    Psychosocial Issues and Recommendations:    Spiritual Concerns and Recommendations:    [facility] Discharge Plans:  Return back to [facility] with [facility] to continue following    Review of Visit (Include All Collaboration- including names of hospital and family involved during admission/visit):RN arrived at bedside. Patient is sitting up in bed watching TV. Patient is alert and oriented x4. Patient denied SOA or pain at this time. Patient states her left arm has improved so much she can actually bend it. Patient states her plan is to return back to [facility] tomorrow. Patient states she does not want an ICD, wants to remain a DNR and does not want to be intubated. Patient has no questions, needs or issues at this time. RN reviewed visit with KEVIN Smalls. RN reviewed possible discharge tomorrow. RN asked Serina to call tomorrow when discharge plan is decided. Team will continue to monitor patient's medical condition and discharge plan daily.        Pedrito Schultz RN

## 2021-04-03 NOTE — SIGNIFICANT NOTE
04/03/21 1529   OTHER   Discipline occupational therapist   Rehab Time/Intention   Session Not Performed other (see comments)  (OT lymphedema orders placed. Discussed with RN that Lymphedema therapist is back in house on 4/5. Nursing to assist with ace wraps until 4/5.)   Recommendation   OT - Next Appointment 04/05/21

## 2021-04-03 NOTE — CONSULTS
[facility] HEART SPECIALISTS CONSULT        Subjective:     Encounter Date:04/02/2021      Patient ID: Glenis Anderson is a 65 y.o. female.      Chief Complaint   Patient presents with   • Edema       HPI:   Ms. Anderson is a 65-year-old female who has been followed by Dr. Shaffer at [facility].  She has a history of coronary disease as well as HFrEF of 16%.  She has a history of peripheral vascular disease and is status post right AKA.  She states that she has chest discomfort frequently.  She states that she was told that she needs a heart transplant, but she feels like she is too old and therefore entered [facility] care for a period of time.  She is now residing at [facility] and states that she is does not want to be resuscitated.  She is not interested in having an ICD placed.  She was brought to [facility] emergency department secondary to left arm swelling as well as swelling in her left leg and abdominal cavity.    She has a history of anxiety, chronic pain and diabetes.  She has a history of severe PAD and ultimately underwent amputation.  She has essential hypertension and dyslipidemia and recurrent congestive heart failure.  She has a history of chronic pulmonary emboli.  She has a history of paroxysmal atrial fibrillation    Today she is not complaining of shortness of air.  No chest discomfort today.  She states that her left upper extremity is uncomfortable from swelling.  No orthopnea or paroxysmal nocturnal dyspnea.  She has not noted any palpitations.  She is in sinus rhythm on the monitor.  Her renal function is normal.  Her potassium is 3.7.  Hemoglobin is 12.4.  proBNP is elevated at 2873.  Initial troponin is negative  EKG demonstrates sinus rhythm with PVCs.  Changes suggestive of prior anteroseptal wall infarction  Arterial and venous Dopplers of the left upper extremity are normal.      The following portions of the patient's history were reviewed and updated as  appropriate: allergies, current medications, past family history, past medical history, past social history, past surgical history and problem list.      Past Medical History:   Diagnosis Date   • CHF (congestive heart failure) (CMS/[facility])    • Gangrene of toe of right foot (CMS/[facility])    • Hyperlipidemia    • Hypertension    • PAD (peripheral artery disease) (CMS/[facility])    • Peripheral vascular disease (CMS/[facility])    • Tobacco abuse    • Type 2 diabetes mellitus (CMS/[facility])          Past Surgical History:   Procedure Laterality Date   • APPENDECTOMY  2003   • FEMORAL ARTERY STENT     • FOOT SURGERY     • HYSTERECTOMY  2002   • TOE AMPUTATION Right          Social History     Socioeconomic History   • Marital status: Single     Spouse name: Not on file   • Number of children: Not on file   • Years of education: Not on file   • Highest education level: Not on file   Tobacco Use   • Smoking status: Current Every Day Smoker     Packs/day: 1.00     Years: 50.00     Pack years: 50.00     Types: Cigarettes   • Smokeless tobacco: Never Used   • Tobacco comment: daily caffiene   Vaping Use   • Vaping Use: Never used   Substance and Sexual Activity   • Alcohol use: No   • Drug use: No   • Sexual activity: Defer         Allergies   Allergen Reactions   • Dilaudid [Hydromorphone] Unknown - Low Severity   • Morphine Nausea And Vomiting       Current Medications:   Scheduled Meds:bumetanide, 2 mg, Intravenous, Q12H  fentaNYL, 1 patch, Transdermal, Q72H  insulin lispro, 0-9 Units, Subcutaneous, TID AC  sodium chloride, 10 mL, Intravenous, Q12H      Continuous Infusions:     Review of Systems   Constitutional: Positive for malaise/fatigue. Negative for chills, decreased appetite, fever, weight gain and weight loss.   HENT: Negative for congestion, hoarse voice, nosebleeds and sore throat.    Eyes: Negative for blurred vision, double vision and visual disturbance.   Cardiovascular: Positive for leg swelling. Negative for chest pain,  "claudication, dyspnea on exertion, irregular heartbeat, near-syncope, orthopnea, palpitations, paroxysmal nocturnal dyspnea and syncope.   Respiratory: Negative for cough, hemoptysis, shortness of breath, sleep disturbances due to breathing, snoring, sputum production and wheezing.    Endocrine: Negative for cold intolerance, heat intolerance, polydipsia and polyuria.   Hematologic/Lymphatic: Negative for adenopathy and bleeding problem. Does not bruise/bleed easily.   Skin: Negative for flushing, itching, nail changes and rash.   Musculoskeletal: Negative for arthritis, back pain, joint pain, muscle cramps, muscle weakness, myalgias and neck pain.        Left upper extremity discomfort   Gastrointestinal: Negative for bloating, abdominal pain, anorexia, change in bowel habit, constipation, diarrhea, heartburn, hematemesis, hematochezia, jaundice, melena, nausea and vomiting.   Genitourinary: Negative for dysuria, hematuria and nocturia.   Neurological: Negative for brief paralysis, disturbances in coordination, excessive daytime sleepiness, dizziness, headaches, light-headedness, loss of balance, numbness, paresthesias, seizures and vertigo.   Psychiatric/Behavioral: Negative for altered mental status and depression. The patient is not nervous/anxious.    Allergic/Immunologic: Negative for environmental allergies and hives.          Objective:         /67 (BP Location: Right arm, Patient Position: Lying)   Pulse 80   Temp 98.9 °F (37.2 °C) (Oral)   Resp 18   Ht 167.6 cm (66\")   Wt 106 kg (233 lb 4 oz)   SpO2 97%   BMI 37.65 kg/m²     Vitals and nursing note reviewed.   Constitutional:       General: Not in acute distress.     Appearance: Well-developed. Not diaphoretic.      Comments: Obese female no acute distress   Eyes:      General: No scleral icterus.     Conjunctiva/sclera: Conjunctivae normal.      Pupils: Pupils are equal, round, and reactive to light.   HENT:      Head: Normocephalic and " atraumatic.   Neck:      Thyroid: No thyromegaly.      Vascular: JVD elevated.      Lymphadenopathy: No cervical adenopathy.   Pulmonary:      Effort: Pulmonary effort is normal. No respiratory distress.      Breath sounds: Normal breath sounds. No wheezing. No rales.   Cardiovascular:      PMI at left midclavicular line. Normal rate. Occasional ectopic beats. Regular rhythm. Normal S1. Normal S2.      Murmurs:  There is a 1/6 systolic murmur heard at the left lower sternal border.      No gallop. No S3 and S4 gallop. No click. No rub.   Pulses:     Intact distal pulses. No decreased pulses.   Edema:     Peripheral edema ( 1-2+ pitting edema of the left lower extremity) present.  Abdominal:      General: Bowel sounds are normal. There is no distension.      Palpations: Abdomen is soft. There is no abdominal mass.      Tenderness: There is no abdominal tenderness. There is no guarding or rebound.   Musculoskeletal:      Cervical back: Normal range of motion and neck supple.      Comments: Right AKA  Wheelchair-bound Skin:     General: Skin is warm and dry.      Coloration: Skin is not pale.      Findings: No rash.   Neurological:      Mental Status: Alert, oriented to person, place, and time and oriented to person, place and time.      Coordination: Coordination normal.   Psychiatric:         Behavior: Behavior normal.               ASCVD RIsk Score::  The 10-year ASCVD risk score (Waynesage KEMP Jr., et al., 2013) is: 30.8%    Values used to calculate the score:      Age: 65 years      Sex: Female      Is Non- : No      Diabetic: Yes      Tobacco smoker: Yes      Systolic Blood Pressure: 128 mmHg      Is BP treated: Yes      HDL Cholesterol: 49 mg/dL      Total Cholesterol: 232 mg/dL      Lab Review:   not applicable     Results from last 7 days   Lab Units 04/03/21  0530 04/02/21  1513   SODIUM mmol/L 139 140   POTASSIUM mmol/L 3.7 3.6   CHLORIDE mmol/L 96* 95*   CO2 mmol/L 32.6* 35.2*   BUN mg/dL  17 15   CREATININE mg/dL 0.61 0.74   GLUCOSE mg/dL 113* 115*   CALCIUM mg/dL 8.5* 8.5*   AST (SGOT) U/L  --  14   ALT (SGPT) U/L  --  8     Results from last 7 days   Lab Units 04/02/21  1513   TROPONIN T ng/mL <0.010     Results from last 7 days   Lab Units 04/03/21  0530 04/02/21  1513   WBC 10*3/mm3 6.36 4.62   HEMOGLOBIN g/dL 12.4 11.9*   HEMATOCRIT % 38.5 37.4   PLATELETS 10*3/mm3 158 151     Results from last 7 days   Lab Units 04/02/21  1513   INR  1.42*     Results from last 7 days   Lab Units 04/02/21  1513   MAGNESIUM mg/dL 1.9           Invalid input(s): LDLCALC  Results from last 7 days   Lab Units 04/02/21  1513   PROBNP pg/mL 2,873.0*           Recent Radiology:  Imaging Results (Most Recent)     Procedure Component Value Units Date/Time    XR Chest 1 View [[id number]] Collected: 04/02/21 1604     Updated: 04/02/21 1647    Narrative:      ONE VIEW PORTABLE CHEST     HISTORY: Shortness of breath. Left-sided swelling.     COMPARISON: There are no prior exams.      FINDINGS: There is mild-to-moderate cardiomegaly with what appears to be  a small left pleural effusion and minimal atelectasis at the left base.  The lungs are otherwise clear. There is no overt CHF.     This report was finalized on 4/2/2021 4:44 PM by Dr. Stone Mcknight M.D.         I have reviewed her lab, EKG and radiographic studies.            Assessment:         Active Hospital Problems    Diagnosis  POA   • **Edema of left upper extremity [R60.0]  Yes   • HTN (hypertension) [I10]  Yes   • Acute on chronic systolic congestive heart failure (CMS/HCC) [I50.23]  Yes   • Hyperlipidemia [E78.5]  Yes   • Diabetes mellitus type 2, uncontrolled, with complications (CMS/HCC) [E11.8, E11.65]  Yes   • PVD (peripheral vascular disease) (CMS/HCC) [I73.9]  Yes   • Morbid obesity (CMS/HCC) [E66.01]  Yes     1.  Acute on chronic heart failure  She states that her ejection fraction is 16 to 17%.  She has been offered cardiac transplantation evaluation as  well as an ICD but has declined.  She presents to the emergency department with a left upper and lower extremity edema.  Her proBNP is elevated and she does appear to be volume overloaded.    2.  CAD  Ms. Anderson states that she has underlying coronary disease.  I cannot find in her old records a cardiac catheterization.  She did have a nuclear study in 2017 which revealed no areas of ischemia.  Her ejection fraction at that time was normal.    3.  Essential hypertension    4.  Diabetes mellitus    5.  Paroxysmal atrial fibrillation    6.  History of severe PAD  Status post gangrene of her right foot and subsequent amputation..  She is now status post right AKA    7.  Dyslipidemia    8.  History of anxiety         Plan:     I will obtain an echocardiogram to assess her left ventricular function as well as right ventricular function.  She reportedly has biventricular failure.  Agree with intravenous Bumex.  Suspect her edema is secondary to heart failure.  We will check a lipid profile  If her left ventricular ejection fraction is indeed less than 35%, she should be on afterload reduction and will initiate lisinopril since her renal function is normal.  In addition we will initiate carvedilol after she has had some diuresis.  We will initiate statin therapy since she has known vascular disease.  She does have a history of paroxysmal atrial fibrillation as was noted in some of her notes and old records.  She is not on anticoagulation.  Will discuss with her whether she wishes to initiate.  Her [id number] is 6 which represents a 9.8% annual risk of stroke without anticoagulation    I did rediscuss the possibility of an ICD with her today and she continues to decline.  She does state that she does not wish to be resuscitated.   We will obtain an additional troponin and EKG               Keyla Quintero MD  04/03/21  11:08 EDT

## 2021-04-04 ENCOUNTER — APPOINTMENT (OUTPATIENT)
Dept: CARDIOLOGY | Facility: HOSPITAL | Age: 66
End: 2021-04-04

## 2021-04-04 PROBLEM — R31.9 HEMATURIA: Status: ACTIVE | Noted: 2021-04-04

## 2021-04-04 PROBLEM — R30.0 DYSURIA: Status: ACTIVE | Noted: 2021-04-04

## 2021-04-04 LAB
ANION GAP SERPL CALCULATED.3IONS-SCNC: 9 MMOL/L (ref 5–15)
AORTIC DIMENSIONLESS INDEX: 0.5 (DI)
BACTERIA SPEC AEROBE CULT: ABNORMAL
BACTERIA UR QL AUTO: ABNORMAL /HPF
BH CV ECHO MEAS - ACS: 1.6 CM
BH CV ECHO MEAS - AO MAX PG (FULL): 7.3 MMHG
BH CV ECHO MEAS - AO MAX PG: 9.5 MMHG
BH CV ECHO MEAS - AO MEAN PG (FULL): 3 MMHG
BH CV ECHO MEAS - AO MEAN PG: 4 MMHG
BH CV ECHO MEAS - AO ROOT AREA (BSA CORRECTED): 1.2
BH CV ECHO MEAS - AO ROOT AREA: 5.3 CM^2
BH CV ECHO MEAS - AO ROOT DIAM: 2.6 CM
BH CV ECHO MEAS - AO V2 MAX: 154 CM/SEC
BH CV ECHO MEAS - AO V2 MEAN: 91.1 CM/SEC
BH CV ECHO MEAS - AO V2 VTI: 27 CM
BH CV ECHO MEAS - AVA(I,A): 1.7 CM^2
BH CV ECHO MEAS - AVA(I,D): 1.7 CM^2
BH CV ECHO MEAS - AVA(V,A): 1.7 CM^2
BH CV ECHO MEAS - AVA(V,D): 1.7 CM^2
BH CV ECHO MEAS - BSA(HAYCOCK): 2.3 M^2
BH CV ECHO MEAS - BSA: 2.1 M^2
BH CV ECHO MEAS - BZI_BMI: 37.8 KILOGRAMS/M^2
BH CV ECHO MEAS - BZI_METRIC_HEIGHT: 167.6 CM
BH CV ECHO MEAS - BZI_METRIC_WEIGHT: 106.1 KG
BH CV ECHO MEAS - EDV(CUBED): 125 ML
BH CV ECHO MEAS - EDV(MOD-SP2): 92 ML
BH CV ECHO MEAS - EDV(MOD-SP4): 166 ML
BH CV ECHO MEAS - EDV(TEICH): 118.2 ML
BH CV ECHO MEAS - EF(CUBED): 36.4 %
BH CV ECHO MEAS - EF(MOD-BP): 34 %
BH CV ECHO MEAS - EF(MOD-SP2): 37 %
BH CV ECHO MEAS - EF(MOD-SP4): 37.3 %
BH CV ECHO MEAS - EF(TEICH): 29.7 %
BH CV ECHO MEAS - ESV(CUBED): 79.5 ML
BH CV ECHO MEAS - ESV(MOD-SP2): 58 ML
BH CV ECHO MEAS - ESV(MOD-SP4): 104 ML
BH CV ECHO MEAS - ESV(TEICH): 83.1 ML
BH CV ECHO MEAS - FS: 14 %
BH CV ECHO MEAS - IVS/LVPW: 0.67
BH CV ECHO MEAS - IVSD: 1 CM
BH CV ECHO MEAS - LAT PEAK E' VEL: 5.3 CM/SEC
BH CV ECHO MEAS - LV DIASTOLIC VOL/BSA (35-75): 77.6 ML/M^2
BH CV ECHO MEAS - LV MASS(C)D: 247.6 GRAMS
BH CV ECHO MEAS - LV MASS(C)DI: 115.8 GRAMS/M^2
BH CV ECHO MEAS - LV MAX PG: 2.2 MMHG
BH CV ECHO MEAS - LV MEAN PG: 1 MMHG
BH CV ECHO MEAS - LV SYSTOLIC VOL/BSA (12-30): 48.6 ML/M^2
BH CV ECHO MEAS - LV V1 MAX: 73.8 CM/SEC
BH CV ECHO MEAS - LV V1 MEAN: 46.1 CM/SEC
BH CV ECHO MEAS - LV V1 VTI: 13.6 CM
BH CV ECHO MEAS - LVIDD: 5 CM
BH CV ECHO MEAS - LVIDS: 4.3 CM
BH CV ECHO MEAS - LVLD AP2: 8.2 CM
BH CV ECHO MEAS - LVLD AP4: 9.3 CM
BH CV ECHO MEAS - LVLS AP2: 7.4 CM
BH CV ECHO MEAS - LVLS AP4: 9 CM
BH CV ECHO MEAS - LVOT AREA (M): 3.5 CM^2
BH CV ECHO MEAS - LVOT AREA: 3.5 CM^2
BH CV ECHO MEAS - LVOT DIAM: 2.1 CM
BH CV ECHO MEAS - LVPWD: 1.5 CM
BH CV ECHO MEAS - MED PEAK E' VEL: 5.1 CM/SEC
BH CV ECHO MEAS - MV A MAX VEL: 90 CM/SEC
BH CV ECHO MEAS - MV DEC SLOPE: 355 CM/SEC^2
BH CV ECHO MEAS - MV DEC TIME: 194 SEC
BH CV ECHO MEAS - MV E MAX VEL: 74.1 CM/SEC
BH CV ECHO MEAS - MV E/A: 0.82
BH CV ECHO MEAS - MV MAX PG: 2.4 MMHG
BH CV ECHO MEAS - MV MEAN PG: 1 MMHG
BH CV ECHO MEAS - MV P1/2T MAX VEL: 66.5 CM/SEC
BH CV ECHO MEAS - MV P1/2T: 54.9 MSEC
BH CV ECHO MEAS - MV V2 MAX: 77.5 CM/SEC
BH CV ECHO MEAS - MV V2 MEAN: 50.1 CM/SEC
BH CV ECHO MEAS - MV V2 VTI: 13.7 CM
BH CV ECHO MEAS - MVA P1/2T LCG: 3.3 CM^2
BH CV ECHO MEAS - MVA(P1/2T): 4 CM^2
BH CV ECHO MEAS - MVA(VTI): 3.4 CM^2
BH CV ECHO MEAS - PA MAX PG (FULL): 1.8 MMHG
BH CV ECHO MEAS - PA MAX PG: 2.4 MMHG
BH CV ECHO MEAS - PA V2 MAX: 77.4 CM/SEC
BH CV ECHO MEAS - PVA(V,A): 2.8 CM^2
BH CV ECHO MEAS - PVA(V,D): 2.8 CM^2
BH CV ECHO MEAS - QP/QS: 0.94
BH CV ECHO MEAS - RAP SYSTOLE: 8 MMHG
BH CV ECHO MEAS - RV MAX PG: 0.59 MMHG
BH CV ECHO MEAS - RV MEAN PG: 0 MMHG
BH CV ECHO MEAS - RV V1 MAX: 38.5 CM/SEC
BH CV ECHO MEAS - RV V1 MEAN: 25.5 CM/SEC
BH CV ECHO MEAS - RV V1 VTI: 7.7 CM
BH CV ECHO MEAS - RVOT AREA: 5.7 CM^2
BH CV ECHO MEAS - RVOT DIAM: 2.7 CM
BH CV ECHO MEAS - SI(AO): 67 ML/M^2
BH CV ECHO MEAS - SI(CUBED): 21.3 ML/M^2
BH CV ECHO MEAS - SI(LVOT): 22 ML/M^2
BH CV ECHO MEAS - SI(MOD-SP2): 15.9 ML/M^2
BH CV ECHO MEAS - SI(MOD-SP4): 29 ML/M^2
BH CV ECHO MEAS - SI(TEICH): 16.5 ML/M^2
BH CV ECHO MEAS - SV(AO): 143.4 ML
BH CV ECHO MEAS - SV(CUBED): 45.5 ML
BH CV ECHO MEAS - SV(LVOT): 47.1 ML
BH CV ECHO MEAS - SV(MOD-SP2): 34 ML
BH CV ECHO MEAS - SV(MOD-SP4): 62 ML
BH CV ECHO MEAS - SV(RVOT): 44.3 ML
BH CV ECHO MEAS - SV(TEICH): 35.2 ML
BH CV ECHO MEAS - TAPSE (>1.6): 1.7 CM
BH CV ECHO MEAS - TR MAX VEL: 153 CM/SEC
BH CV ECHO MEASUREMENTS AVERAGE E/E' RATIO: 14.25
BH CV XLRA - TDI S': 8.6 CM/SEC
BILIRUB UR QL STRIP: NEGATIVE
BUN SERPL-MCNC: 18 MG/DL (ref 8–23)
BUN/CREAT SERPL: 25 (ref 7–25)
CALCIUM SPEC-SCNC: 8.8 MG/DL (ref 8.6–10.5)
CHLORIDE SERPL-SCNC: 93 MMOL/L (ref 98–107)
CHOLEST SERPL-MCNC: 124 MG/DL (ref 0–200)
CLARITY UR: CLEAR
CO2 SERPL-SCNC: 34 MMOL/L (ref 22–29)
COLOR UR: YELLOW
CREAT SERPL-MCNC: 0.72 MG/DL (ref 0.57–1)
GFR SERPL CREATININE-BSD FRML MDRD: 81 ML/MIN/1.73
GLUCOSE BLDC GLUCOMTR-MCNC: 103 MG/DL (ref 70–130)
GLUCOSE BLDC GLUCOMTR-MCNC: 111 MG/DL (ref 70–130)
GLUCOSE BLDC GLUCOMTR-MCNC: 114 MG/DL (ref 70–130)
GLUCOSE BLDC GLUCOMTR-MCNC: 85 MG/DL (ref 70–130)
GLUCOSE SERPL-MCNC: 88 MG/DL (ref 65–99)
GLUCOSE UR STRIP-MCNC: NEGATIVE MG/DL
GRAM STN SPEC: ABNORMAL
HDLC SERPL-MCNC: 34 MG/DL (ref 40–60)
HGB UR QL STRIP.AUTO: ABNORMAL
HYALINE CASTS UR QL AUTO: ABNORMAL /LPF
ISOLATED FROM: ABNORMAL
KETONES UR QL STRIP: NEGATIVE
LDLC SERPL CALC-MCNC: 75 MG/DL (ref 0–100)
LDLC/HDLC SERPL: 2.21 {RATIO}
LEFT ATRIUM VOLUME INDEX: 36 ML/M2
LEUKOCYTE ESTERASE UR QL STRIP.AUTO: ABNORMAL
MAGNESIUM SERPL-MCNC: 2 MG/DL (ref 1.6–2.4)
MAXIMAL PREDICTED HEART RATE: 153 BPM
NITRITE UR QL STRIP: NEGATIVE
PH UR STRIP.AUTO: 8.5 [PH] (ref 5–8)
POTASSIUM SERPL-SCNC: 3.4 MMOL/L (ref 3.5–5.2)
PROT UR QL STRIP: ABNORMAL
RBC # UR: ABNORMAL /HPF
REF LAB TEST METHOD: ABNORMAL
SINUS: 3.1 CM
SODIUM SERPL-SCNC: 136 MMOL/L (ref 136–145)
SP GR UR STRIP: 1.01 (ref 1–1.03)
SQUAMOUS #/AREA URNS HPF: ABNORMAL /HPF
STRESS TARGET HR: 130 BPM
TRIGL SERPL-MCNC: 74 MG/DL (ref 0–150)
UROBILINOGEN UR QL STRIP: ABNORMAL
VLDLC SERPL-MCNC: 15 MG/DL (ref 5–40)
WBC UR QL AUTO: ABNORMAL /HPF

## 2021-04-04 PROCEDURE — 87077 CULTURE AEROBIC IDENTIFY: CPT | Performed by: HOSPITALIST

## 2021-04-04 PROCEDURE — 93306 TTE W/DOPPLER COMPLETE: CPT | Performed by: INTERNAL MEDICINE

## 2021-04-04 PROCEDURE — 83735 ASSAY OF MAGNESIUM: CPT | Performed by: INTERNAL MEDICINE

## 2021-04-04 PROCEDURE — 93306 TTE W/DOPPLER COMPLETE: CPT

## 2021-04-04 PROCEDURE — 87086 URINE CULTURE/COLONY COUNT: CPT | Performed by: HOSPITALIST

## 2021-04-04 PROCEDURE — 81001 URINALYSIS AUTO W/SCOPE: CPT | Performed by: HOSPITALIST

## 2021-04-04 PROCEDURE — 80061 LIPID PANEL: CPT | Performed by: INTERNAL MEDICINE

## 2021-04-04 PROCEDURE — 82962 GLUCOSE BLOOD TEST: CPT

## 2021-04-04 PROCEDURE — 87186 SC STD MICRODIL/AGAR DIL: CPT | Performed by: HOSPITALIST

## 2021-04-04 PROCEDURE — 99221 1ST HOSP IP/OBS SF/LOW 40: CPT | Performed by: INTERNAL MEDICINE

## 2021-04-04 PROCEDURE — 25010000002 PERFLUTREN (DEFINITY) 8.476 MG IN SODIUM CHLORIDE (PF) 0.9 % 10 ML INJECTION: Performed by: INTERNAL MEDICINE

## 2021-04-04 PROCEDURE — 93010 ELECTROCARDIOGRAM REPORT: CPT | Performed by: INTERNAL MEDICINE

## 2021-04-04 PROCEDURE — 93005 ELECTROCARDIOGRAM TRACING: CPT | Performed by: INTERNAL MEDICINE

## 2021-04-04 PROCEDURE — 80048 BASIC METABOLIC PNL TOTAL CA: CPT | Performed by: INTERNAL MEDICINE

## 2021-04-04 RX ORDER — LISINOPRIL 5 MG/1
5 TABLET ORAL
Status: DISCONTINUED | OUTPATIENT
Start: 2021-04-05 | End: 2021-04-05 | Stop reason: HOSPADM

## 2021-04-04 RX ADMIN — ATORVASTATIN CALCIUM 40 MG: 20 TABLET, FILM COATED ORAL at 08:43

## 2021-04-04 RX ADMIN — SODIUM CHLORIDE, PRESERVATIVE FREE 10 ML: 5 INJECTION INTRAVENOUS at 21:06

## 2021-04-04 RX ADMIN — BUMETANIDE 2 MG: 0.25 INJECTION INTRAMUSCULAR; INTRAVENOUS at 18:10

## 2021-04-04 RX ADMIN — CLONAZEPAM 0.5 MG: 0.5 TABLET ORAL at 08:43

## 2021-04-04 RX ADMIN — LISINOPRIL 2.5 MG: 2.5 TABLET ORAL at 08:43

## 2021-04-04 RX ADMIN — PERFLUTREN 4 ML: 6.52 INJECTION, SUSPENSION INTRAVENOUS at 09:36

## 2021-04-04 RX ADMIN — OXYCODONE HYDROCHLORIDE AND ACETAMINOPHEN 1 TABLET: 5; 325 TABLET ORAL at 21:06

## 2021-04-04 RX ADMIN — BUMETANIDE 2 MG: 0.25 INJECTION INTRAMUSCULAR; INTRAVENOUS at 08:43

## 2021-04-04 NOTE — PROGRESS NOTES
[facility] Visit Report    Glenis Anderson  [id number]  4/4/2021    Admission [facility]/T [facility] Dx: yes    Reason for [facility] Admission:Hypertensive heart with heart failure    Symptom  Management: edema in left arm    Nursing/Medication Recommendations:nothing at this time    Psychosocial Issues and Recommendations:    Spiritual Concerns and Recommendations:    [facility] Discharge Plans:  When patient is medically stable, patient will return back to [facility] with [facility] following    Review of Visit (Include All Collaboration- including names of hospital and family involved during admission/visit):RN reviewed Epic notes. RN arrived on the unit and received report from KEVIN Smalls. She reports at this time there are no discharge plans for today, She reports abnormal blood culture and abnormal UA. RN arrived at bedside. Patient is awake and sitting up in bed. Patient states she just woke up. Patient states she is doing fine. She reports no changes in condition from yesterday. She verbalizes understanding of the plan not to discharge today. She states she would rather address the issues why she is here. She has no needs from [facility] today. Team will continue to visit daily to assess patient's condition and discharge plan.        Pedrito Schultz RN

## 2021-04-04 NOTE — CONSULTS
[facility] CONSULT        Subjective:     Encounter Date:04/02/2021      Patient ID: Glenis Anderson is a 65 y.o. female.      Chief Complaint   Patient presents with   • Edema       HPI:  Patient is admitted with heart failure.  Ms Anderson is a 64 yo with a past medical history significant for HTN, HLD, DM, PAD s/p right AKA.  She apparently also has a history of heart failure.  She currently resides in a facility and has noted increased abdominal bloating, left arm and leg swelling for at least 3 days.  She also has noted some increased dyspnea but no chest pain or palpitations.  She denies any fever, cough, pleuritic pain, or dizziness.    Echo from today showed an EF of 34%, mod-severe RV enlargement, severe ALBERTO, mild LAE, mild AS, severe TR and mild MR.          Past Medical History:   Diagnosis Date   • CHF (congestive heart failure) (CMS/Formerly [facility])    • Gangrene of toe of right foot (CMS/Formerly [facility])    • Hyperlipidemia    • Hypertension    • PAD (peripheral artery disease) (CMS/Formerly [facility])    • Peripheral vascular disease (CMS/Formerly [facility])    • Tobacco abuse    • Type 2 diabetes mellitus (CMS/Formerly [facility])          Past Surgical History:   Procedure Laterality Date   • APPENDECTOMY  2003   • FEMORAL ARTERY STENT     • FOOT SURGERY     • HYSTERECTOMY  2002   • TOE AMPUTATION Right          Social History     Socioeconomic History   • Marital status: Single     Spouse name: Not on file   • Number of children: Not on file   • Years of education: Not on file   • Highest education level: Not on file   Tobacco Use   • Smoking status: Current Every Day Smoker     Packs/day: 1.00     Years: 50.00     Pack years: 50.00     Types: Cigarettes   • Smokeless tobacco: Never Used   • Tobacco comment: daily caffiene   Vaping Use   • Vaping Use: Never used   Substance and Sexual Activity   • Alcohol use: No   • Drug use: No   • Sexual activity: Defer         Allergies   Allergen Reactions   • Dilaudid [Hydromorphone] Unknown - Low Severity   •  "Morphine Nausea And Vomiting       Current Medications:   Scheduled Meds:atorvastatin, 40 mg, Oral, Daily  bumetanide, 2 mg, Intravenous, Q12H  fentaNYL, 1 patch, Transdermal, Q72H  insulin lispro, 0-9 Units, Subcutaneous, TID AC  lisinopril, 2.5 mg, Oral, Q24H  sodium chloride, 10 mL, Intravenous, Q12H      Continuous Infusions:     Review of Systems   Constitutional: Positive for malaise/fatigue.   Cardiovascular: Positive for leg swelling. Negative for chest pain, irregular heartbeat, orthopnea and syncope.   Respiratory: Positive for shortness of breath.    Gastrointestinal: Positive for bloating.          Objective:         /88   Pulse 78   Temp 97.5 °F (36.4 °C) (Oral)   Resp 18   Ht 167.6 cm (66\")   Wt 106 kg (234 lb)   SpO2 96%   BMI 37.77 kg/m²     Constitutional:       General: Not in acute distress.     Appearance: Morbidly obese.      Comments: Morbidly obese, no acute distress   Eyes:      General: No scleral icterus.     Conjunctiva/sclera: Conjunctivae normal.      Pupils: Pupils are equal, round, and reactive to light.   HENT:      Head: Normocephalic and atraumatic.   Neck:      Thyroid: No thyromegaly.      Vascular: JVD present.   Pulmonary:      Effort: Pulmonary effort is normal.      Comments: Diminished bases  Cardiovascular:      Normal rate. Regular rhythm.      Murmurs: There is a grade 1/6 midsystolic murmur.   Abdominal:      General: Bowel sounds are normal.      Palpations: Abdomen is soft.   Musculoskeletal:      Cervical back: Normal range of motion.      Comments: Right AKA, left leg markedly edematous Skin:     General: Skin is warm and dry.   Neurological:      Mental Status: Alert and oriented to person, place, and time.               ASCVD RIsk Score::  The ASCVD Risk score (Marenisco VIRIDIANA Jr., et al., 2013) failed to calculate for the following reasons:    The valid total cholesterol range is 130 to 320 mg/dL      Lab Review:       Results from last 7 days   Lab Units " 04/04/21  0623 04/03/21  0530 04/02/21  1513   SODIUM mmol/L 136 139 140   POTASSIUM mmol/L 3.4* 3.7 3.6   CHLORIDE mmol/L 93* 96* 95*   CO2 mmol/L 34.0* 32.6* 35.2*   BUN mg/dL 18 17 15   CREATININE mg/dL 0.72 0.61 0.74   GLUCOSE mg/dL 88 113* 115*   CALCIUM mg/dL 8.8 8.5* 8.5*   AST (SGOT) U/L  --   --  14   ALT (SGPT) U/L  --   --  8     Results from last 7 days   Lab Units 04/03/21  2227 04/02/21  1513   TROPONIN T ng/mL <0.010 <0.010     Results from last 7 days   Lab Units 04/03/21  0530 04/02/21  1513   WBC 10*3/mm3 6.36 4.62   HEMOGLOBIN g/dL 12.4 11.9*   HEMATOCRIT % 38.5 37.4   PLATELETS 10*3/mm3 158 151     Results from last 7 days   Lab Units 04/02/21  1513   INR  1.42*     Results from last 7 days   Lab Units 04/04/21  0623 04/02/21  1513   MAGNESIUM mg/dL 2.0 1.9     Results from last 7 days   Lab Units 04/04/21  0623   CHOLESTEROL mg/dL 124   TRIGLYCERIDES mg/dL 74   HDL CHOL mg/dL 34*     Results from last 7 days   Lab Units 04/02/21  1513   PROBNP pg/mL 2,873.0*           I personally viewed and interpreted the patient's EKG/Telemetry data    Recent Radiology:  Imaging Results (Most Recent)     Procedure Component Value Units Date/Time    XR Chest 1 View [[id number]] Collected: 04/02/21 1604     Updated: 04/02/21 1647    Narrative:      ONE VIEW PORTABLE CHEST     HISTORY: Shortness of breath. Left-sided swelling.     COMPARISON: There are no prior exams.      FINDINGS: There is mild-to-moderate cardiomegaly with what appears to be  a small left pleural effusion and minimal atelectasis at the left base.  The lungs are otherwise clear. There is no overt CHF.     This report was finalized on 4/2/2021 4:44 PM by Dr. Stone Mcknight M.D.                 Assessment:         Active Hospital Problems    Diagnosis  POA   • **Edema of left upper extremity [R60.0]  Yes   • Encounter for palliative care [Z51.5]  Not Applicable   • HTN (hypertension) [I10]  Yes   • Acute on chronic systolic congestive heart  failure (CMS/HCC) [I50.23]  Yes   • Hyperlipidemia [E78.5]  Yes   • Diabetes mellitus type 2, uncontrolled, with complications (CMS/HCC) [E11.8, E11.65]  Yes   • PVD (peripheral vascular disease) (CMS/HCC) [I73.9]  Yes   • Morbid obesity (CMS/HCC) [E66.01]  Yes          Plan:       1. Acute on chronic systolic and diastolic heart failure - on Bumex, increase lisinopril  2. HTN - controlled  3. LUE edema - US  Negative for DVT  4. DM - on insulin  5. HLD - statin           Gordon Monzon MD  04/04/21  10:58 EDT

## 2021-04-04 NOTE — PROGRESS NOTES
Name: Glenis Anderson ADMIT: 2021   : 1955  PCP: Provider, No Known    MRN: [id number] LOS: 0 days   AGE/SEX: 65 y.o. female  ROOM: [facility]/     Subjective   Subjective    Left-sided swelling improved.  Catheter placed developed some apparent gross hematuria and dysuria per nursing staff    Review of Systems   Constitutional: Negative for fever.   Respiratory: Negative for cough.    Cardiovascular: Positive for leg swelling. Negative for chest pain.   Gastrointestinal: Negative for abdominal pain.   Genitourinary: Positive for dysuria and hematuria.      Objective   Objective   Vital Signs  Temp:  [97.5 °F (36.4 °C)-98.6 °F (37 °C)] 97.5 °F (36.4 °C)  Heart Rate:  [71-91] 78  Resp:  [18] 18  BP: (114-134)/(64-88) 114/88  SpO2:  [94 %-98 %] 96 %  on  Flow (L/min):  [2-3] 3;   Device (Oxygen Therapy): nasal cannula  Body mass index is 37.77 kg/m².    Physical Exam  Vitals and nursing note reviewed.   Constitutional:       General: She is not in acute distress.  HENT:      Head: Normocephalic and atraumatic.   Cardiovascular:      Rate and Rhythm: Normal rate and regular rhythm.   Pulmonary:      Effort: Pulmonary effort is normal. No respiratory distress.      Breath sounds: Normal breath sounds.   Abdominal:      General: Bowel sounds are normal.      Palpations: Abdomen is soft.      Tenderness: There is no abdominal tenderness. There is no guarding or rebound.   Musculoskeletal:      Left lower leg: Edema (2+ LUE and LLE) present.      Right Lower Extremity: Right leg is amputated above knee.   Skin:     General: Skin is warm and dry.   Neurological:      General: No focal deficit present.      Mental Status: She is alert and oriented to person, place, and time.   Psychiatric:         Mood and Affect: Mood normal.         Behavior: Behavior normal.     Results Review  I reviewed the patient's new clinical results.  Results from last 7 days   Lab Units 21  0530 21  1513   WBC 10*3/mm3  6.36 4.62   HEMOGLOBIN g/dL 12.4 11.9*   PLATELETS 10*3/mm3 158 151     Results from last 7 days   Lab Units 04/04/21  0623 04/03/21  0530 04/02/21  1513   SODIUM mmol/L 136 139 140   POTASSIUM mmol/L 3.4* 3.7 3.6   CHLORIDE mmol/L 93* 96* 95*   CO2 mmol/L 34.0* 32.6* 35.2*   BUN mg/dL 18 17 15   CREATININE mg/dL 0.72 0.61 0.74   GLUCOSE mg/dL 88 113* 115*     Estimated Creatinine Clearance: 84 mL/min (by C-G formula based on SCr of 0.72 mg/dL).    Results from last 7 days   Lab Units 04/02/21  1513   ALBUMIN g/dL 3.60   BILIRUBIN mg/dL 1.3*   ALK PHOS U/L 198*   AST (SGOT) U/L 14   ALT (SGPT) U/L 8     Results from last 7 days   Lab Units 04/04/21  0623 04/03/21  0530 04/02/21  1513   CALCIUM mg/dL 8.8 8.5* 8.5*   ALBUMIN g/dL  --   --  3.60   MAGNESIUM mg/dL 2.0  --  1.9     Results from last 7 days   Lab Units 04/02/21  1513   PROCALCITONIN ng/mL 0.08     Hemoglobin A1C   Date/Time Value Ref Range Status   04/03/2021 0530 5.60 4.80 - 5.60 % Final     Glucose   Date/Time Value Ref Range Status   04/04/2021 0623 85 70 - 130 mg/dL Final   04/03/2021 2102 109 70 - 130 mg/dL Final   04/03/2021 1603 110 70 - 130 mg/dL Final   04/03/2021 1103 128 70 - 130 mg/dL Final   04/03/2021 0608 108 70 - 130 mg/dL Final   04/02/2021 2052 110 70 - 130 mg/dL Final       Scheduled Meds  atorvastatin, 40 mg, Oral, Daily  bumetanide, 2 mg, Intravenous, Q12H  fentaNYL, 1 patch, Transdermal, Q72H  insulin lispro, 0-9 Units, Subcutaneous, TID AC  [START ON 4/5/2021] lisinopril, 5 mg, Oral, Q24H  sodium chloride, 10 mL, Intravenous, Q12H    Continuous Infusions   PRN Meds  •  acetaminophen **OR** acetaminophen **OR** acetaminophen  •  clonazePAM  •  cyclobenzaprine  •  dextrose  •  dextrose  •  glucagon (human recombinant)  •  ondansetron  •  oxyCODONE-acetaminophen  •  [COMPLETED] Insert peripheral IV **AND** sodium chloride  •  sodium chloride     Diet  Diet Regular; Low Sodium, Cardiac, Consistent Carbohydrate      Results for orders  placed during the hospital encounter of 04/02/21    Adult Transthoracic Echo Complete W/ Cont if Necessary Per Protocol    Interpretation Summary  · Calculated left ventricular EF = 34% Estimated left ventricular EF was in agreement with the calculated left ventricular EF. Left ventricular systolic function is severely decreased.  · Left ventricular diastolic function is consistent with (grade I) impaired relaxation.  · The right ventricular cavity is moderate to severely dilated.  · Mildly reduced right ventricular systolic function noted.  · Left atrial volume is mildly increased.  · The right atrial cavity is severely dilated.  · The right ventricular cavity is moderate to severely dilated.  · There is mild calcification of the aortic valve, mainly affecting the non-coronary, left coronary and right coronary cusp(s).  · Mild aortic valve stenosis is present.  · Severe tricuspid valve regurgitation is present.  · Mild mitral valve regurgitation is present.  · There is a small (<1cm) pericardial effusion adjacent to the left ventricle.      Intake/Output Summary (Last 24 hours) at 4/4/2021 1131  Last data filed at 4/4/2021 0825  Gross per 24 hour   Intake 743 ml   Output 1550 ml   Net -807 ml          Assessment/Plan     Active Hospital Problems    Diagnosis  POA   • **Edema of left upper extremity [R60.0]  Yes   • Dysuria [R30.0]  Unknown   • Hematuria [R31.9]  Unknown   • Encounter for palliative care [Z51.5]  Not Applicable   • HTN (hypertension) [I10]  Yes   • Acute on chronic systolic congestive heart failure (CMS/HCC) [I50.23]  Yes   • Hyperlipidemia [E78.5]  Yes   • Diabetes mellitus type 2, uncontrolled, with complications (CMS/HCC) [E11.8, E11.65]  Yes   • PVD (peripheral vascular disease) (CMS/HCC) [I73.9]  Yes   • Morbid obesity (CMS/HCC) [E66.01]  Yes      Resolved Hospital Problems   No resolved problems to display.     65 y.o. female admitted with Edema of left upper extremity.    · History of CHF  presents with left-sided edema  · Left arm and leg Dopplers negative for clot, small left pleural effusion with minimal atelectasis  · Continue IV diuresis, over 3 L out since yesterday morning  · Probably due to patient reducing her Bumex  · echocardiogram noted  · Hematuria, dysuria  · King recently placed   · Check urinalysis  · Blood culture contaminant  · Hypertension controlled  · Diabetes: A1c 5.60  · Body mass index is 37.77 kg/m².   · Paroxysmal atrial fibrillation not on anticoagulation  · DNR.  Hospice patient, wishes conservative treatment  · Discussed with patient and nursing staff  · Anticipate discharge to SNU facility tomorrow.    Jair Abdalla MD  [facility]  04/04/21  11:28 EDT    I wore protective equipment throughout this patient encounter including a face mask, gloves, and protective eyewear.  Hand hygiene was performed before donning protective equipment and after removal when leaving the room.

## 2021-04-04 NOTE — PLAN OF CARE
Pt has no c/o pain, Klonopin x1, vs stable, 2-3L NC, turn Q2, 2D echo completed, d/c tena obtained u/a, purewick placed monitored      Problem: Adult Inpatient Plan of Care  Goal: Plan of Care Review  Outcome: Ongoing, Progressing  Goal: Patient-Specific Goal (Individualized)  Outcome: Ongoing, Progressing  Goal: Absence of Hospital-Acquired Illness or Injury  Outcome: Ongoing, Progressing  Intervention: Identify and Manage Fall Risk  Recent Flowsheet Documentation  Taken 4/4/2021 1400 by Serina Caceres RN  Safety Promotion/Fall Prevention:   activity supervised   assistive device/personal items within reach   clutter free environment maintained   fall prevention program maintained   safety round/check completed   room organization consistent   nonskid shoes/slippers when out of bed  Taken 4/4/2021 0843 by Serina Caceres RN  Safety Promotion/Fall Prevention:   activity supervised   assistive device/personal items within reach  Intervention: Prevent Skin Injury  Recent Flowsheet Documentation  Taken 4/4/2021 1600 by Serina Caceres RN  Body Position: tilted, right  Taken 4/4/2021 1400 by Serina Caceres RN  Body Position: tilted, left  Skin Protection: adhesive use limited  Taken 4/4/2021 1200 by Serina Caceres RN  Body Position: sitting up in bed  Taken 4/4/2021 0843 by Serina Caceres RN  Body Position: sitting up in bed  Skin Protection: adhesive use limited  Intervention: Prevent and Manage VTE (venous thromboembolism) Risk  Recent Flowsheet Documentation  Taken 4/4/2021 0843 by Serina Caceres RN  VTE Prevention/Management:   bilateral   dorsiflexion/plantar flexion performed  Intervention: Prevent Infection  Recent Flowsheet Documentation  Taken 4/4/2021 1600 by Serina Caceres RN  Infection Prevention: rest/sleep promoted  Taken 4/4/2021 1400 by Serina Caceres RN  Infection Prevention:   rest/sleep promoted   single patient room provided  Taken 4/4/2021 1200 by Serina Caceres RN  Infection  Prevention:   rest/sleep promoted   single patient room provided  Taken 4/4/2021 0843 by Serina Caceres RN  Infection Prevention:   rest/sleep promoted   single patient room provided  Goal: Optimal Comfort and Wellbeing  Outcome: Ongoing, Progressing  Intervention: Provide Person-Centered Care  Recent Flowsheet Documentation  Taken 4/4/2021 1400 by Serina Caceres RN  Trust Relationship/Rapport: care explained  Taken 4/4/2021 0843 by Serina Caceres RN  Trust Relationship/Rapport:   care explained   thoughts/feelings acknowledged   reassurance provided   questions encouraged   questions answered   emotional support provided  Goal: Readiness for Transition of Care  Outcome: Ongoing, Progressing     Problem: Fall Injury Risk  Goal: Absence of Fall and Fall-Related Injury  Outcome: Ongoing, Progressing  Intervention: Identify and Manage Contributors to Fall Injury Risk  Recent Flowsheet Documentation  Taken 4/4/2021 1400 by Serina Caceres RN  Medication Review/Management: medications reviewed  Self-Care Promotion:   independence encouraged   BADL personal objects within reach   BADL personal routines maintained  Taken 4/4/2021 0843 by Serina Caceres RN  Medication Review/Management: medications reviewed  Self-Care Promotion:   independence encouraged   BADL personal objects within reach   BADL personal routines maintained  Intervention: Promote Injury-Free Environment  Recent Flowsheet Documentation  Taken 4/4/2021 1400 by Serina Caceres RN  Safety Promotion/Fall Prevention:   activity supervised   assistive device/personal items within reach   clutter free environment maintained   fall prevention program maintained   safety round/check completed   room organization consistent   nonskid shoes/slippers when out of bed  Taken 4/4/2021 0843 by Serina Caceres RN  Safety Promotion/Fall Prevention:   activity supervised   assistive device/personal items within reach     Problem: Skin Injury Risk Increased  Goal:  Skin Health and Integrity  Outcome: Ongoing, Progressing  Intervention: Optimize Skin Protection  Recent Flowsheet Documentation  Taken 4/4/2021 1600 by Serina Caceres RN  Head of Bed (HOB): HOB elevated  Taken 4/4/2021 1400 by Serina Caceres RN  Pressure Reduction Techniques: frequent weight shift encouraged  Head of Bed (HOB): HOB at 30 degrees  Pressure Reduction Devices: pressure-redistributing mattress utilized  Skin Protection: adhesive use limited  Taken 4/4/2021 1200 by Serina Caceres RN  Head of Bed (HOB): HOB elevated  Taken 4/4/2021 0843 by Serina Caceres RN  Pressure Reduction Techniques: frequent weight shift encouraged  Head of Bed (HOB): HOB elevated  Pressure Reduction Devices: pressure-redistributing mattress utilized  Skin Protection: adhesive use limited     Problem: Adjustment to Illness (Heart Failure)  Goal: Optimal Coping  Outcome: Ongoing, Progressing  Intervention: Support and Optimize Psychosocial Response to Chronic Illness  Recent Flowsheet Documentation  Taken 4/4/2021 1400 by Serina Caceres RN  Family/Support System Care:   self-care encouraged   presence promoted  Taken 4/4/2021 0843 by Serina Caceres RN  Supportive Measures: active listening utilized  Family/Support System Care:   self-care encouraged   presence promoted     Problem: Arrhythmia/Dysrhythmia (Heart Failure)  Goal: Stable Heart Rate and Rhythm  Outcome: Ongoing, Progressing     Problem: Cardiac Output Decreased (Heart Failure)  Goal: Optimal Cardiac Output  Outcome: Ongoing, Progressing  Intervention: Optimize Cardiac Output  Recent Flowsheet Documentation  Taken 4/4/2021 0843 by Serina Caceres RN  Environmental Support: calm environment promoted     Problem: Fluid Imbalance (Heart Failure)  Goal: Fluid Balance  Outcome: Ongoing, Progressing     Problem: Functional Ability Impaired (Heart Failure)  Goal: Optimal Functional Ability  Outcome: Ongoing, Progressing  Intervention: Optimize Functional  Ability  Recent Flowsheet Documentation  Taken 4/4/2021 1400 by Serina Caceres RN  Activity Management: activity adjusted per tolerance  Self-Care Promotion:   independence encouraged   BADL personal objects within reach   BADL personal routines maintained  Taken 4/4/2021 0843 by Serina Caceres RN  Activity Management: activity adjusted per tolerance  Self-Care Promotion:   independence encouraged   BADL personal objects within reach   BADL personal routines maintained     Problem: Oral Intake Inadequate (Heart Failure)  Goal: Optimal Nutrition Intake  Outcome: Ongoing, Progressing     Problem: Respiratory Compromise (Heart Failure)  Goal: Effective Oxygenation and Ventilation  Outcome: Ongoing, Progressing  Intervention: Promote Airway Secretion Clearance  Recent Flowsheet Documentation  Taken 4/4/2021 1400 by Serina Caceres RN  Cough And Deep Breathing: done independently per patient  Taken 4/4/2021 0843 by Serina Caceres RN  Cough And Deep Breathing: done independently per patient     Problem: Sleep Disordered Breathing (Heart Failure)  Goal: Effective Breathing Pattern During Sleep  Outcome: Ongoing, Progressing     Problem: Anxiety  Goal: Anxiety Reduction or Resolution  Outcome: Ongoing, Progressing  Intervention: Promote Anxiety Reduction  Recent Flowsheet Documentation  Taken 4/4/2021 1400 by Serina Caceres RN  Family/Support System Care:   self-care encouraged   presence promoted  Taken 4/4/2021 0843 by Serina Caceres RN  Supportive Measures: active listening utilized  Family/Support System Care:   self-care encouraged   presence promoted   Goal Outcome Evaluation:

## 2021-04-04 NOTE — PLAN OF CARE
Goal Outcome Evaluation:             Patient alert follows commands turns in bed right aka, skin care provided. Pericare provided. King catheter care provided. Prn pain and anxiety meds given. No nausea noted. Iv bumex given. No acute distress noted will continue to monitor.

## 2021-04-05 VITALS
WEIGHT: 228.62 LBS | DIASTOLIC BLOOD PRESSURE: 62 MMHG | HEIGHT: 66 IN | TEMPERATURE: 97.6 F | BODY MASS INDEX: 36.74 KG/M2 | SYSTOLIC BLOOD PRESSURE: 119 MMHG | RESPIRATION RATE: 16 BRPM | OXYGEN SATURATION: 100 % | HEART RATE: 82 BPM

## 2021-04-05 LAB
ANION GAP SERPL CALCULATED.3IONS-SCNC: 9.6 MMOL/L (ref 5–15)
BUN SERPL-MCNC: 17 MG/DL (ref 8–23)
BUN/CREAT SERPL: 23 (ref 7–25)
CALCIUM SPEC-SCNC: 8.3 MG/DL (ref 8.6–10.5)
CHLORIDE SERPL-SCNC: 97 MMOL/L (ref 98–107)
CO2 SERPL-SCNC: 31.4 MMOL/L (ref 22–29)
CREAT SERPL-MCNC: 0.74 MG/DL (ref 0.57–1)
GFR SERPL CREATININE-BSD FRML MDRD: 78 ML/MIN/1.73
GLUCOSE BLDC GLUCOMTR-MCNC: 126 MG/DL (ref 70–130)
GLUCOSE BLDC GLUCOMTR-MCNC: 80 MG/DL (ref 70–130)
GLUCOSE SERPL-MCNC: 86 MG/DL (ref 65–99)
POTASSIUM SERPL-SCNC: 3.7 MMOL/L (ref 3.5–5.2)
SODIUM SERPL-SCNC: 138 MMOL/L (ref 136–145)

## 2021-04-05 PROCEDURE — 99233 SBSQ HOSP IP/OBS HIGH 50: CPT | Performed by: INTERNAL MEDICINE

## 2021-04-05 PROCEDURE — 82962 GLUCOSE BLOOD TEST: CPT

## 2021-04-05 PROCEDURE — 80048 BASIC METABOLIC PNL TOTAL CA: CPT | Performed by: INTERNAL MEDICINE

## 2021-04-05 RX ORDER — FENTANYL 25 UG/H
1 PATCH TRANSDERMAL
Qty: 1 PATCH | Refills: 0 | Status: SHIPPED | OUTPATIENT
Start: 2021-04-05

## 2021-04-05 RX ORDER — PETROLATUM 420 MG/G
OINTMENT TOPICAL EVERY 12 HOURS SCHEDULED
Status: DISCONTINUED | OUTPATIENT
Start: 2021-04-05 | End: 2021-04-05 | Stop reason: HOSPADM

## 2021-04-05 RX ORDER — SULFAMETHOXAZOLE AND TRIMETHOPRIM 800; 160 MG/1; MG/1
1 TABLET ORAL 2 TIMES DAILY
Start: 2021-04-05 | End: 2021-04-08

## 2021-04-05 RX ORDER — CLONAZEPAM 0.5 MG/1
TABLET ORAL
Qty: 12 TABLET | Refills: 0 | Status: SHIPPED | OUTPATIENT
Start: 2021-04-05

## 2021-04-05 RX ORDER — LISINOPRIL 5 MG/1
5 TABLET ORAL
Start: 2021-04-06

## 2021-04-05 RX ORDER — ATORVASTATIN CALCIUM 40 MG/1
40 TABLET, FILM COATED ORAL DAILY
Start: 2021-04-06

## 2021-04-05 RX ADMIN — CLONAZEPAM 0.5 MG: 0.5 TABLET ORAL at 08:10

## 2021-04-05 RX ADMIN — OXYCODONE HYDROCHLORIDE AND ACETAMINOPHEN 1 TABLET: 5; 325 TABLET ORAL at 08:04

## 2021-04-05 RX ADMIN — ATORVASTATIN CALCIUM 40 MG: 20 TABLET, FILM COATED ORAL at 08:04

## 2021-04-05 RX ADMIN — PETROLATUM 100 APPLICATION: 420 OINTMENT TOPICAL at 13:43

## 2021-04-05 RX ADMIN — BUMETANIDE 2 MG: 0.25 INJECTION INTRAMUSCULAR; INTRAVENOUS at 08:10

## 2021-04-05 RX ADMIN — CLONAZEPAM 0.5 MG: 0.5 TABLET ORAL at 13:43

## 2021-04-05 RX ADMIN — SODIUM CHLORIDE, PRESERVATIVE FREE 10 ML: 5 INJECTION INTRAVENOUS at 08:05

## 2021-04-05 RX ADMIN — LISINOPRIL 5 MG: 5 TABLET ORAL at 08:04

## 2021-04-05 NOTE — PROGRESS NOTES
Case Management Discharge Note      Final Note: PT discharged to [facility].  RAJI Bae RN         Selected Continued Care - Admitted Since 4/2/2021     Destination Coordination complete    Service Provider Selected Services Address Phone Fax Patient Preferred    [facility] [address] [place] [address] [phone] --          Durable Medical Equipment    No services have been selected for the patient.              Dialysis/Infusion    No services have been selected for the patient.              Home Medical Care    No services have been selected for the patient.              Therapy    No services have been selected for the patient.              Community Resources    No services have been selected for the patient.                  Transportation Services  Ambulance: [facility]    Final Discharge Disposition Code: 04 - intermediate care facility

## 2021-04-05 NOTE — DISCHARGE SUMMARY
[facility]    Date of Discharge:  4/5/2021    PCP: Provider, No Known    Discharge Diagnosis:   Active Hospital Problems    Diagnosis  POA   • **Edema of left upper extremity [R60.0]  Yes   • Dysuria [R30.0]  Unknown   • Hematuria [R31.9]  Unknown   • Encounter for palliative care [Z51.5]  Not Applicable   • HTN (hypertension) [I10]  Yes   • Acute on chronic systolic congestive heart failure (CMS/HCC) [I50.23]  Yes   • Hyperlipidemia [E78.5]  Yes   • Diabetes mellitus type 2, uncontrolled, with complications (CMS/HCC) [E11.8, E11.65]  Yes   • PVD (peripheral vascular disease) (CMS/[facility]) [I73.9]  Yes   • Morbid obesity (CMS/[facility]) [E66.01]  Yes      Resolved Hospital Problems   No resolved problems to display.          Consults     Date and Time Order Name Status Description    4/2/2021  7:12 PM Inpatient Cardiology Consult Completed     4/2/2021  5:33 PM [facility] (on-call MD unless specified) Details Completed         Hospital Course  Please see history and physical for details. Patient is a 65 y.o. female with a history of advanced CHF under hospice care admitted with left-sided leg swelling.  Dopplers were negative for clot.  Reportedly due to feeling like her facility was shortstaffed and not wanting to sit in urine for too long she cut her Bumex dose to 1 a day instead of 2 a day.  She did this about 3 to 4 weeks ago.  She was diuresed with significant improvement and today she would very much like to go home.  At her facility she should resume previous Bumex dosing at discharge.  An echocardiogram was done and showed that her EF is 34%.  I discussed this finding with her and she wanted to continue as she has been, and specifically declined cardiology follow-up.  She states she feels like she is ready to go and does not wish to change her goals of care.    She also had a catheter placed a couple days ago at the facility and she had some discomfort and hematuria  associated with it.  Catheter has since been removed and her symptoms have resolved.  symptoms resolved after removal of the dialysis catheter however urinalysis showed TNTC WBCs and 21-30 RBCs and cultures grew greater than 100,000 Proteus species. Will be discharged on a 3-day course of Bactrim and facility can follow-up sensitivities on Proteus.  She had one blood culture that was a contaminant.      Results for orders placed during the hospital encounter of 04/02/21    Adult Transthoracic Echo Complete W/ Cont if Necessary Per Protocol    Interpretation Summary  · Calculated left ventricular EF = 34% Estimated left ventricular EF was in agreement with the calculated left ventricular EF. Left ventricular systolic function is severely decreased.  · Left ventricular diastolic function is consistent with (grade I) impaired relaxation.  · The right ventricular cavity is moderate to severely dilated.  · Mildly reduced right ventricular systolic function noted.  · Left atrial volume is mildly increased.  · The right atrial cavity is severely dilated.  · The right ventricular cavity is moderate to severely dilated.  · There is mild calcification of the aortic valve, mainly affecting the non-coronary, left coronary and right coronary cusp(s).  · Mild aortic valve stenosis is present.  · Severe tricuspid valve regurgitation is present.  · Mild mitral valve regurgitation is present.  · There is a small (<1cm) pericardial effusion adjacent to the left ventricle.     I discussed the patient's findings and my recommendations with patient and nursing staff.    Condition on Discharge: Stable.  She was also started on ACE inhibitor as well as statin therapy by cardiology.  Given her goals of care may consider stopping these.  She is also on modified diet again given goals of care may consider switching to a diet as tolerated.    Temp:  [97.6 °F (36.4 °C)-97.9 °F (36.6 °C)] 97.6 °F (36.4 °C)  Heart Rate:  [67-82] 82  Resp:   [16] 16  BP: (109-119)/(56-69) 119/62  Body mass index is 36.9 kg/m².    Physical Exam  Vitals and nursing note reviewed.   Constitutional:       General: She is not in acute distress.     Appearance: She is ill-appearing (chronic).   HENT:      Head: Normocephalic and atraumatic.   Cardiovascular:      Rate and Rhythm: Normal rate and regular rhythm.   Pulmonary:      Effort: Pulmonary effort is normal. No respiratory distress.      Breath sounds: Normal breath sounds.   Abdominal:      General: Bowel sounds are normal.      Palpations: Abdomen is soft.      Tenderness: There is no abdominal tenderness. There is no guarding or rebound.   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   Skin:     General: Skin is warm and dry.   Neurological:      General: No focal deficit present.      Mental Status: She is alert and oriented to person, place, and time.   Psychiatric:         Mood and Affect: Mood normal.         Behavior: Behavior normal.     While in the room and during my examination of the patient I wore gloves, mask, eye protection.  I washed my hands before and after this patient encounter.     Disposition: Skilled Nursing Facility (DC - External)       Discharge Medications      New Medications      Instructions Start Date   atorvastatin 40 MG tablet  Commonly known as: LIPITOR   40 mg, Oral, Daily   Start Date: April 6, 2021     lisinopril 5 MG tablet  Commonly known as: PRINIVIL,ZESTRIL   5 mg, Oral, Every 24 Hours Scheduled   Start Date: April 6, 2021     sulfamethoxazole-trimethoprim 800-160 MG per tablet  Commonly known as: Bactrim DS   160 mg, Oral, 2 Times Daily         Changes to Medications      Instructions Start Date   clonazePAM 0.5 MG tablet  Commonly known as: KlonoPIN  What changed:   · how much to take  · how to take this  · when to take this  · reasons to take this  · additional instructions  · Another medication  with the same name was removed. Continue taking this medication, and follow the directions you see here.   and every 8 hours scheduled         Continue These Medications      Instructions Start Date   acetaminophen 500 MG tablet  Commonly known as: TYLENOL   1,000 mg, Oral, Every 8 Hours PRN      bumetanide 2 MG tablet  Commonly known as: BUMEX   4 mg, Oral, Daily      bumetanide 1 MG tablet  Commonly known as: BUMEX   1 mg, Oral, Daily, afternoon       cyclobenzaprine 10 MG tablet  Commonly known as: FLEXERIL   10 mg, Oral, 3 Times Daily PRN      fentaNYL 25 MCG/HR patch  Commonly known as: DURAGESIC   1 patch, Transdermal, Every 72 Hours      loperamide 2 MG capsule  Commonly known as: IMODIUM   2 mg, Oral, As Needed, Max of 6 tablets per day       MiraLax 17 g packet  Generic drug: polyethylene glycol   17 g, Oral, Daily PRN            Diet Instructions     Diet: Regular, Specialty Diet, Consistent Carbohydrate, Cardiac; Thin Liquids, No Restrictions; Low Sodium      Discharge Diet:  Regular  Specialty Diet  Consistent Carbohydrate  Cardiac       Fluid Consistency: Thin Liquids, No Restrictions    Specialty Diets: Low Sodium         Activity Instructions     Activity as Tolerated           Additional Instructions for the Follow-ups that You Need to Schedule     Call MD for problems / concerns.   As directed        Follow-up Information     [facility].    Specialty: Hospice  Contact information:  [address]  [facility] [address] [phone]                Pending Labs     Order Current Status    Urine Culture - Urine, Urine, Catheter In process    Blood Culture - Blood, Arm, Right Preliminary result         Jair Abdalla MD  04/05/21  09:39 EDT    Discharge time spent greater than 30 minutes.

## 2021-04-05 NOTE — PROGRESS NOTES
Continued Stay Note  [facility]     Patient Name: Glenis Anderson  MRN: [id number]  Today's Date: 4/5/2021    Admit Date: 4/2/2021    Discharge Plan     Row Name 04/05/21 0934       Plan    Plan  Plan return to [facility].   RAJI Bae RN    Patient/Family in Agreement with Plan  yes    Plan Comments  FACE SHEET VERIFIED/ IM LETTER SIGNED.  Spoke with pt at bedside.  Pt states she is from [facility] and plans on returning.  Shobha ( [phone]) called to and pt is from Medicaid pending bed and can return.  Plan return to [facility].   RAJI Bae RN        Discharge Codes    No documentation.       Expected Discharge Date and Time     Expected Discharge Date Expected Discharge Time    Apr 5, 2021             Destiney Bae, RN

## 2021-04-05 NOTE — PLAN OF CARE
Goal Outcome Evaluation:       No complications pt slept most of the night will continue to monitor

## 2021-04-05 NOTE — NURSING NOTE
CWOCN- patient has very dry, flaky LLE. She says that she would try to keep lotion on it more but that she is dependent on caregivers to put lotion on her leg. I recommend to use aquaphor for LLE. Patient verbalized understanding.

## 2021-04-05 NOTE — PROGRESS NOTES
[facility]        LOS:  LOS: 1 day   Patient Name: Glenis Anderson  Age/Sex: 65 y.o. female  : 1955  MRN: [id number]    Day of Service: 21   Length of Stay: 1  Encounter Provider: Saul Leblanc MD  Place of Service: [facility]  Patient Care Team:  Provider, No Known as PCP - Lennox Moore DPM (Podiatry)    Subjective:     Chief Complaint:  F/U Heart Failure    Subjective:   Patient reports that she sees [facility] and has a hx of systolic heart failure, in which she declined advanced heart failure interventions.  She is currently in hospice care and does not wish to have any additional cardiac work-up.  She would like to return home to the nursing home today.  She denies any chest pain or dyspnea currently.      Current Medications:   Scheduled Meds:atorvastatin, 40 mg, Oral, Daily  bumetanide, 2 mg, Intravenous, Q12H  fentaNYL, 1 patch, Transdermal, Q72H  insulin lispro, 0-9 Units, Subcutaneous, TID AC  lisinopril, 5 mg, Oral, Q24H  Petrolatum, , Topical, Q12H  sodium chloride, 10 mL, Intravenous, Q12H      Continuous Infusions:     Allergies:  Allergies   Allergen Reactions   • Dilaudid [Hydromorphone] Unknown - Low Severity   • Morphine Nausea And Vomiting       ROS  Constitutional: Patient afebrile no chills or unexpected weight changes  Respiratory: No cough, no wheezing or dyspnea  Cardiovascular: Today the patient complains of no chest pain, palpitations, dyspnea, orthopnea and no edema  Gastrointestinal: No nausea, vomiting, constipation or diarrhea.  No melena or dark stools    All other systems reviewed and are negative        Objective:     Temp:  [97.6 °F (36.4 °C)-97.9 °F (36.6 °C)] 97.6 °F (36.4 °C)  Heart Rate:  [67-82] 82  Resp:  [16] 16  BP: (109-119)/(56-69) 119/62     Intake/Output Summary (Last 24 hours) at 2021 1345  Last data filed at 2021 0648  Gross per 24 hour    Intake --   Output 1500 ml   Net -1500 ml     Body mass index is 36.9 kg/m².      04/04/21  0934 04/04/21  0937 04/05/21  0632   Weight: 106 kg (234 lb) 106 kg (234 lb) 104 kg (228 lb 9.9 oz)       Physical exam  Constitutional: well-nourished, and appears stated age in no acute distress  PERRL: Conjunctiva clear, no pallor, anicteric  HENMT: normocephalic, normal dentition, no cyanosis or pallor  Neck:no bruits, or thrills and bilateral normal carotid upstroke. Normal jugular venous pressure  Cardiovascular: No parasternal heaves an non-displaced focal PMI. Normal rate and rhythm: no rub, gallop, murmur or click and normal S1 and S2; no lower or upper extremity edema.   Lungs: unlabored, no wheezing with no rales or rhonchi on auscultation.  Extremities: Warm, no clubbing, cyanosis. Full and equal peripheral pulses in extremities with no bruits appreciated.   Abdomen: soft, non-tender, non-distended  Musculoskeletal: no joint tenderness or swelling and no erythema  Skin: Warm and dry, non-erythematous   Neuro:alert and normal affect. Oriented to time, place and person.       Lab Review:   Results from last 7 days   Lab Units 04/05/21  0504 04/04/21  0623 04/02/21  1513   SODIUM mmol/L 138 136 140   POTASSIUM mmol/L 3.7 3.4* 3.6   CHLORIDE mmol/L 97* 93* 95*   CO2 mmol/L 31.4* 34.0* 35.2*   BUN mg/dL 17 18 15   CREATININE mg/dL 0.74 0.72 0.74   GLUCOSE mg/dL 86 88 115*   CALCIUM mg/dL 8.3* 8.8 8.5*   AST (SGOT) U/L  --   --  14   ALT (SGPT) U/L  --   --  8     Results from last 7 days   Lab Units 04/03/21  2227 04/02/21  1513   TROPONIN T ng/mL <0.010 <0.010     Results from last 7 days   Lab Units 04/03/21  0530 04/02/21  1513   WBC 10*3/mm3 6.36 4.62   HEMOGLOBIN g/dL 12.4 11.9*   HEMATOCRIT % 38.5 37.4   PLATELETS 10*3/mm3 158 151     Results from last 7 days   Lab Units 04/02/21  1513   INR  1.42*     Results from last 7 days   Lab Units 04/04/21  0623 04/02/21  1513   MAGNESIUM mg/dL 2.0 1.9     Results from  last 7 days   Lab Units 04/04/21  0623   CHOLESTEROL mg/dL 124   TRIGLYCERIDES mg/dL 74   HDL CHOL mg/dL 34*     Results from last 7 days   Lab Units 04/02/21  1513   PROBNP pg/mL 2,873.0*           Recent Radiology:  Imaging Results (Most Recent)     Procedure Component Value Units Date/Time    XR Chest 1 View [[id number]] Collected: 04/02/21 1604     Updated: 04/02/21 1647    Narrative:      ONE VIEW PORTABLE CHEST     HISTORY: Shortness of breath. Left-sided swelling.     COMPARISON: There are no prior exams.      FINDINGS: There is mild-to-moderate cardiomegaly with what appears to be  a small left pleural effusion and minimal atelectasis at the left base.  The lungs are otherwise clear. There is no overt CHF.     This report was finalized on 4/2/2021 4:44 PM by Dr. Stone Mcknight M.D.             ECHOCARDIOGRAM:    Results for orders placed during the hospital encounter of 04/02/21    Adult Transthoracic Echo Complete W/ Cont if Necessary Per Protocol    Interpretation Summary  · Calculated left ventricular EF = 34% Estimated left ventricular EF was in agreement with the calculated left ventricular EF. Left ventricular systolic function is severely decreased.  · Left ventricular diastolic function is consistent with (grade I) impaired relaxation.  · The right ventricular cavity is moderate to severely dilated.  · Mildly reduced right ventricular systolic function noted.  · Left atrial volume is mildly increased.  · The right atrial cavity is severely dilated.  · The right ventricular cavity is moderate to severely dilated.  · There is mild calcification of the aortic valve, mainly affecting the non-coronary, left coronary and right coronary cusp(s).  · Mild aortic valve stenosis is present.  · Severe tricuspid valve regurgitation is present.  · Mild mitral valve regurgitation is present.  · There is a small (<1cm) pericardial effusion adjacent to the left ventricle.        I reviewed the patient's new clinical  results.    EKG:      Assessment:       Edema of left upper extremity    Hyperlipidemia    Diabetes mellitus type 2, uncontrolled, with complications (CMS/[facility])    Morbid obesity (CMS/[facility])    PVD (peripheral vascular disease) (CMS/[facility])    HTN (hypertension)    Acute on chronic systolic congestive heart failure (CMS/[facility])    Encounter for palliative care    Dysuria    Hematuria    1. Acute on chronic systolic and diastolic heart failure   - 2D echo 4/4 shows EF = 34% with grade 1 diastolic heart failure, mild RV dysfunction, mild AS, mild MR, severe TR  - declines ICD  - appears compensated today 4/5  - on IV Bumex, increase lisinopril; not on BB presumably given PAD    2. HTN - controlled    3. LUE edema - US  Negative for DVT    4. DM - on insulin    5. HLD - statin    Plan:   Patient is net negative 4.2 liters and appears compensated today.  Transition from IV to PO bumex.  Patient wishes to return hospice care to the nursing home on medical therapy.  Will sign off.

## 2021-04-05 NOTE — PROGRESS NOTES
[facility] Visit Report    Glenis Anderson  [id number]  4/5/2021    Admission R/T [facility] Dx: Yes    Reason for [facility] Admission: Hypertensive Heart with failure    Symptom  Management: pain/dyspnea with left arm edema    Nursing/Medication Recommendations: monitor for condition changes    Psychosocial Issues and Recommendations:    Spiritual Concerns and Recommendations:    [facility] Discharge Plans:  Complete.  Pt is to go to [facility] with  today @ 1400.      Review of Visit :  Pt is a pps of 30% and is pleasantly alert/oriented x3 and is on 3 L/nc and denies pain/dyspnea and left arm propped up on pillow and does not appear red/hot to touch and not swollen.  Plan is for pt to go to [facility] today with  time at 1400.  Dina BROWN discussed DC plan with pt and she answers yes that she is excited to get back home and is very much aware of going back to [facility] today.  Pt has required GIP level of care for pain/dyspnea and left arm swelling and has required IV Bumex 2 mg q 12 hrs and po Klonopin 0.5 mg x1 and Percoccet 5/325 mg po x1 in past 24 hrs.  No family is present with Dina BROWN placed call to notify POA of pt  time and plan to go back to [facility] today.  Dina BROWN collaborated with Team KEVIN Ordonez regarding pt condition and plan as well.  Dina BROWN collaborated with both Destiney BRONSON and KEVIN An who both verbalize understanding of plan and no scripts are needed at this time with Destiney thurstonzing that she already got them.        Son Lui, KEVIN

## 2021-04-05 NOTE — DISCHARGE PLACEMENT REQUEST
"Valorie Anderson (65 y.o. Female)     Date of Birth Social Security Number Address Home Phone MRN    1955  [address] [id number] [phone] [id number]    [employer] Marital Status          Unknown Single       Admission Date Admission Type Admitting Provider Attending Provider Department, Room/Bed    4/2/21 Emergency [facility] MD Nguyen, Minh Loc, MD [address], E656/1    Discharge Date Discharge Disposition Discharge Destination         Skilled Nursing Facility (DC - External)              Attending Provider: Jair Abdalla MD    Allergies: Dilaudid [Hydromorphone], Morphine    Isolation: None   Infection: None   Code Status: No CPR    Ht: 167.6 cm (66\")   Wt: 104 kg (228 lb 9.9 oz)    Admission Cmt: None   Principal Problem: Edema of left upper extremity [R60.0]                 Active Insurance as of 4/2/2021     Primary Coverage     Payor Plan Insurance Group Employer/Plan Group    MEDICARE MEDICARE A & B      Payor Plan Address Payor Plan Phone Number Payor Plan Fax Number Effective Dates    [address] [phone]  5/1/2020 - None Entered    [place] [address]       Subscriber Name Subscriber Birth Date Member ID       VALORIE ANDERSON 1955 [id number]                 Emergency Contacts      (Rel.) Home Phone Work Phone Mobile Phone    TASHA CARDENAS (Friend) -- -- [phone]              "

## 2021-04-05 NOTE — PROGRESS NOTES
"Physicians Statement of Medical Necessity for  Ambulance Transportation    GENERAL INFORMATION     Name: Glenis Anderson  YOB: 1955  Medicare #: [id number]  Transport Date: 4/5/2021  (Valid for round trips this date, or for scheduled repetitive trips for 60 days from the date signed below.)  Origin: [facility]                Destination: [facility]  Is the Patient's stay covered under Medicare Part A (PPS/DRG?)Yes  Closest appropriate facility? Yes  If this a hosp-hosp transfer? No  Is this a hospice patient? No    MEDICAL NECESSITY QUESTIONAIRE    Ambulance Transportation is medically necessary only if other means of transportation are contraindicated or would be potentially harmful to the patient.  To meet this requirement, the patient must be either \"bed confined\" or suffer from a condition such that transport by means other than an ambulance is contraindicated by the patient's condition.  The following questions must be answered by the healthcare professional signing below for this form to be valid:     1) Describe the MEDICAL CONDITION (physical and/or mental) of this patient AT THE TIME OF AMBULANCE TRANSPORT that requires the patient to be transported in an ambulance, and why transport by other means is contraindicated by the patient's condition: PVD, MORBID OBESITY, IMMOBLIZATION  Past Medical History:   Diagnosis Date   • CHF (congestive heart failure) (CMS/HCC)    • Gangrene of toe of right foot (CMS/HCC)    • Hyperlipidemia    • Hypertension    • PAD (peripheral artery disease) (CMS/HCC)    • Peripheral vascular disease (CMS/HCC)    • Tobacco abuse    • Type 2 diabetes mellitus (CMS/HCC)       Past Surgical History:   Procedure Laterality Date   • APPENDECTOMY  2003   • FEMORAL ARTERY STENT     • FOOT SURGERY     • HYSTERECTOMY  2002   • TOE AMPUTATION Right       2) Is this patient \"bed confined\" as defined below?Yes   To be \"bed confined\" the patient must " satisfy all three of the following criteria:  (1) unable to get up from bed without assistance; AND (2) unable to ambulate;  AND (3) unable to sit in a chair or wheelchair.  3) Can this patient safely be transported by car or wheelchair van (I.e., may safely sit during transport, without an attendant or monitoring?)No   4. In addition to completing questions 1-3 above, please check any of the following conditions that apply*:          *Note: supporting documentation for any boxes checked must be maintained in the patient's medical records Requires oxygen - unable to self administer      SIGNATURE OF PHYSICIAN OR OTHER AUTHORIZED HEALTHCARE PROFESSIONAL    I certify that the above information is true and correct based on my evaluation of this patient, and represent that the patient requires transport by ambulance and that other forms of transport are contraindicated.  I understand that this information will be used by the Centers for Medicare and Medicaid Services (CMS) to support the determiniation of medical necessity for ambulance services, and I represent that I have personal knowledge of the patient's condition at the time of transport.       If this box is checked, I also certify that the patient is physically or mentally incapable of signing the ambulance service's claim form and that the institution with which I am affiliated has furnished care, services or assistance to the patient.  My signature below is made on behalf of the patient pursuant to 42 .36(b)(4). In accordance with 42 .37, the specific reason(s) that the patient is physically or mentally incapable of signing the claim for is as follows:     Signature of Physician or Healthcare Professional  Date/Time:   4/5/2021  @ 1045     (For Scheduled repetitive transport, this form is not valid for transports performed more than 60 days after this date).       CRISTÓBAL YUAN RN                                                                                                                                      --------------------------------------------------------------------------------------------  Printed Name and Credentials of Physician or Authorized Healthcare Professional     *Form must be signed by patient's attending physician for scheduled, repetitive transports,.  For non-repetitive ambulance transports, if unable to obtain the signature of the attending physician, any of the following may sign (please select below):     Physician  Clinical Nurse Specialist X Registered Nurse     Physician Assistant X Discharge Planner  Licensed Practical Nurse     Nurse Practitioner X

## 2021-04-05 NOTE — DISCHARGE PLACEMENT REQUEST
"Valorie Anderson (65 y.o. Female)     Date of Birth Social Security Number Address Home Phone MRN    1955  [address] [id number] [phone] [id number]    [place] Marital Status          Unknown Single       Admission Date Admission Type Admitting Provider Attending Provider Department, Room/Bed    4/2/21 Emergency [facility] MD Nguyen, Minh Loc, MD [address], [address]656/1    Discharge Date Discharge Disposition Discharge Destination         Skilled Nursing Facility (DC - External)              Attending Provider: Jair Abdalla MD    Allergies: Dilaudid [Hydromorphone], Morphine    Isolation: None   Infection: None   Code Status: No CPR    Ht: 167.6 cm (66\")   Wt: 104 kg (228 lb 9.9 oz)    Admission Cmt: None   Principal Problem: Edema of left upper extremity [R60.0]                 Active Insurance as of 4/2/2021     Primary Coverage     Payor Plan Insurance Group Employer/Plan Group    MEDICARE MEDICARE A & B      Payor Plan Address Payor Plan Phone Number Payor Plan Fax Number Effective Dates    [address] [phone]  5/1/2020 - None Entered    [place] [id number]       Subscriber Name Subscriber Birth Date Member ID       VALORIE ANDERSON 1955 [id number]                 Emergency Contacts      (Rel.) Home Phone Work Phone Mobile Phone    TASHA CARDENAS (Friend) -- -- [phone]              "

## 2021-04-05 NOTE — PLAN OF CARE
Goal Outcome Evaluation:     Progress: improving  Outcome Summary: Patient alert and oriented. Patient returning to [facility] today.

## 2021-04-05 NOTE — SIGNIFICANT NOTE
Pt from [facility] under hospice management. Not appropriate for skilled PT. Safe to DC back to prior arrangements whenever medically released

## 2021-04-05 NOTE — PROGRESS NOTES
Continued Stay Note  [facility]     Patient Name: Glenis Anderson  MRN: [id number]  Today's Date: 4/5/2021    Admit Date: 4/2/2021    Discharge Plan     Row Name 04/05/21 0947       Plan    Plan  Plan return to [facility].  RAJI Bae RN    Patient/Family in Agreement with Plan  yes    Plan Comments  Per Shobha pt has a bed and can return today.  Discharge Summary faxed to [facility].  Discharge Summary in packet.  Prescriptions in packet.  Packet to RN.  [facility] arranged to transport pt to [facility].   Plan return to [facility].   RAJI Bae RN    Row Name 04/05/21 0934       Plan    Plan  Plan return to [facility].   RAJI Bae RN    Patient/Family in Agreement with Plan  yes    Plan Comments  FACE SHEET VERIFIED/ IM LETTER SIGNED.  Spoke with pt at bedside.  Pt states she is from [facility] and plans on returning.  Shobha ( [phone]) called to and pt is from Medicaid pending bed and can return.  Plan return to [facility].   RAJI Bae RN        Discharge Codes    No documentation.       Expected Discharge Date and Time     Expected Discharge Date Expected Discharge Time    Apr 5, 2021             Destiney Bae, RN

## 2021-04-06 LAB — BACTERIA SPEC AEROBE CULT: ABNORMAL

## 2021-04-07 LAB — BACTERIA SPEC AEROBE CULT: NORMAL

## 2021-04-15 LAB — QT INTERVAL: 400 MS

## 2022-01-01 ENCOUNTER — APPOINTMENT (OUTPATIENT)
Dept: GENERAL RADIOLOGY | Facility: HOSPITAL | Age: 67
End: 2022-01-01

## 2022-01-01 ENCOUNTER — HOSPITAL ENCOUNTER (INPATIENT)
Facility: HOSPITAL | Age: 67
LOS: 1 days | End: 2022-09-11
Attending: EMERGENCY MEDICINE | Admitting: INTERNAL MEDICINE

## 2022-01-01 ENCOUNTER — APPOINTMENT (OUTPATIENT)
Dept: CT IMAGING | Facility: HOSPITAL | Age: 67
End: 2022-01-01

## 2022-01-01 VITALS
OXYGEN SATURATION: 73 % | TEMPERATURE: 98.8 F | SYSTOLIC BLOOD PRESSURE: 94 MMHG | HEART RATE: 104 BPM | DIASTOLIC BLOOD PRESSURE: 63 MMHG | WEIGHT: 274.47 LBS | BODY MASS INDEX: 44.3 KG/M2 | RESPIRATION RATE: 14 BRPM

## 2022-01-01 DIAGNOSIS — N39.0 UTI (URINARY TRACT INFECTION), BACTERIAL: ICD-10-CM

## 2022-01-01 DIAGNOSIS — N28.9 ACUTE RENAL INSUFFICIENCY: ICD-10-CM

## 2022-01-01 DIAGNOSIS — J96.01 ACUTE RESPIRATORY FAILURE WITH HYPOXIA: Primary | ICD-10-CM

## 2022-01-01 DIAGNOSIS — A49.9 UTI (URINARY TRACT INFECTION), BACTERIAL: ICD-10-CM

## 2022-01-01 DIAGNOSIS — E87.20 LACTIC ACIDOSIS: ICD-10-CM

## 2022-01-01 DIAGNOSIS — I63.9 ACUTE CVA (CEREBROVASCULAR ACCIDENT): ICD-10-CM

## 2022-01-01 DIAGNOSIS — A41.9 SEPSIS WITHOUT ACUTE ORGAN DYSFUNCTION, DUE TO UNSPECIFIED ORGANISM: ICD-10-CM

## 2022-01-01 DIAGNOSIS — E87.5 HYPERKALEMIA: ICD-10-CM

## 2022-01-01 LAB
ALBUMIN SERPL-MCNC: 3.5 G/DL (ref 3.5–5.2)
ALBUMIN/GLOB SERPL: 0.9 G/DL
ALP SERPL-CCNC: 149 U/L (ref 39–117)
ALT SERPL W P-5'-P-CCNC: 23 U/L (ref 1–33)
ANION GAP SERPL CALCULATED.3IONS-SCNC: 26.5 MMOL/L (ref 5–15)
APTT PPP: 33.9 SECONDS (ref 22.7–35.4)
ARTERIAL PATENCY WRIST A: POSITIVE
AST SERPL-CCNC: 61 U/L (ref 1–32)
ATMOSPHERIC PRESS: 754.2 MMHG
BACTERIA UR QL AUTO: ABNORMAL /HPF
BASE EXCESS BLDA CALC-SCNC: -5.1 MMOL/L (ref 0–2)
BASOPHILS # BLD AUTO: 0.04 10*3/MM3 (ref 0–0.2)
BASOPHILS NFR BLD AUTO: 0.2 % (ref 0–1.5)
BDY SITE: ABNORMAL
BILIRUB SERPL-MCNC: 2.3 MG/DL (ref 0–1.2)
BILIRUB UR QL STRIP: NEGATIVE
BUN SERPL-MCNC: 44 MG/DL (ref 8–23)
BUN/CREAT SERPL: 18.2 (ref 7–25)
CALCIUM SPEC-SCNC: 9.1 MG/DL (ref 8.6–10.5)
CHLORIDE SERPL-SCNC: 87 MMOL/L (ref 98–107)
CLARITY UR: ABNORMAL
CO2 SERPL-SCNC: 18.5 MMOL/L (ref 22–29)
COD CRY URNS QL: ABNORMAL /HPF
COLOR UR: ABNORMAL
CREAT SERPL-MCNC: 2.42 MG/DL (ref 0.57–1)
D-LACTATE SERPL-SCNC: 11.7 MMOL/L (ref 0.5–2)
DEPRECATED RDW RBC AUTO: 45.1 FL (ref 37–54)
EGFRCR SERPLBLD CKD-EPI 2021: 21.3 ML/MIN/1.73
EOSINOPHIL # BLD AUTO: 0 10*3/MM3 (ref 0–0.4)
EOSINOPHIL NFR BLD AUTO: 0 % (ref 0.3–6.2)
ERYTHROCYTE [DISTWIDTH] IN BLOOD BY AUTOMATED COUNT: 14.9 % (ref 12.3–15.4)
GLOBULIN UR ELPH-MCNC: 3.7 GM/DL
GLUCOSE SERPL-MCNC: 86 MG/DL (ref 65–99)
GLUCOSE UR STRIP-MCNC: NEGATIVE MG/DL
HCO3 BLDA-SCNC: 22 MMOL/L (ref 22–28)
HCT VFR BLD AUTO: 42.1 % (ref 34–46.6)
HGB BLD-MCNC: 13.8 G/DL (ref 12–15.9)
HGB UR QL STRIP.AUTO: ABNORMAL
HYALINE CASTS UR QL AUTO: ABNORMAL /LPF
IMM GRANULOCYTES # BLD AUTO: 0.59 10*3/MM3 (ref 0–0.05)
IMM GRANULOCYTES NFR BLD AUTO: 3.6 % (ref 0–0.5)
INHALED O2 CONCENTRATION: 100 %
INR PPP: 1.91 (ref 0.9–1.1)
KETONES UR QL STRIP: ABNORMAL
LEUKOCYTE ESTERASE UR QL STRIP.AUTO: ABNORMAL
LYMPHOCYTES # BLD AUTO: 1.25 10*3/MM3 (ref 0.7–3.1)
LYMPHOCYTES NFR BLD AUTO: 7.6 % (ref 19.6–45.3)
MCH RBC QN AUTO: 27.6 PG (ref 26.6–33)
MCHC RBC AUTO-ENTMCNC: 32.8 G/DL (ref 31.5–35.7)
MCV RBC AUTO: 84.2 FL (ref 79–97)
MODALITY: ABNORMAL
MONOCYTES # BLD AUTO: 1.4 10*3/MM3 (ref 0.1–0.9)
MONOCYTES NFR BLD AUTO: 8.5 % (ref 5–12)
NEUTROPHILS NFR BLD AUTO: 13.12 10*3/MM3 (ref 1.7–7)
NEUTROPHILS NFR BLD AUTO: 80.1 % (ref 42.7–76)
NITRITE UR QL STRIP: NEGATIVE
NRBC BLD AUTO-RTO: 0.3 /100 WBC (ref 0–0.2)
NT-PROBNP SERPL-MCNC: ABNORMAL PG/ML (ref 0–900)
O2 A-A PPRESDIFF RESPIRATORY: 0.4 MMHG
PCO2 BLDA: 47 MM HG (ref 35–45)
PEEP RESPIRATORY: 7 CM[H2O]
PH BLDA: 7.28 PH UNITS (ref 7.35–7.45)
PH UR STRIP.AUTO: 6.5 [PH] (ref 5–8)
PLATELET # BLD AUTO: 358 10*3/MM3 (ref 140–450)
PMV BLD AUTO: 10.5 FL (ref 6–12)
PO2 BLDA: 247.9 MM HG (ref 80–100)
POTASSIUM SERPL-SCNC: 6 MMOL/L (ref 3.5–5.2)
PROCALCITONIN SERPL-MCNC: 0.49 NG/ML (ref 0–0.25)
PROT SERPL-MCNC: 7.2 G/DL (ref 6–8.5)
PROT UR QL STRIP: ABNORMAL
PROTHROMBIN TIME: 21.4 SECONDS (ref 11.7–14.2)
QT INTERVAL: 304 MS
RBC # BLD AUTO: 5 10*6/MM3 (ref 3.77–5.28)
RBC # UR STRIP: ABNORMAL /HPF
REF LAB TEST METHOD: ABNORMAL
SAO2 % BLDCOA: 99.8 % (ref 92–99)
SET MECH RESP RATE: 14
SODIUM SERPL-SCNC: 132 MMOL/L (ref 136–145)
SP GR UR STRIP: 1.02 (ref 1–1.03)
SQUAMOUS #/AREA URNS HPF: ABNORMAL /HPF
TOTAL RATE: 17 BREATHS/MINUTE
TROPONIN T SERPL-MCNC: 0.07 NG/ML (ref 0–0.03)
UROBILINOGEN UR QL STRIP: ABNORMAL
VENTILATOR MODE: ABNORMAL
VT ON VENT VENT: 450 ML
WBC # UR STRIP: ABNORMAL /HPF
WBC NRBC COR # BLD: 16.4 10*3/MM3 (ref 3.4–10.8)
YEAST URNS QL MICRO: ABNORMAL /HPF

## 2022-01-01 PROCEDURE — 25010000002 AMIODARONE PER 30 MG: Performed by: EMERGENCY MEDICINE

## 2022-01-01 PROCEDURE — 31500 INSERT EMERGENCY AIRWAY: CPT

## 2022-01-01 PROCEDURE — 70450 CT HEAD/BRAIN W/O DYE: CPT

## 2022-01-01 PROCEDURE — 94799 UNLISTED PULMONARY SVC/PX: CPT

## 2022-01-01 PROCEDURE — 84484 ASSAY OF TROPONIN QUANT: CPT | Performed by: EMERGENCY MEDICINE

## 2022-01-01 PROCEDURE — 87186 SC STD MICRODIL/AGAR DIL: CPT | Performed by: EMERGENCY MEDICINE

## 2022-01-01 PROCEDURE — 94760 N-INVAS EAR/PLS OXIMETRY 1: CPT

## 2022-01-01 PROCEDURE — 5A1935Z RESPIRATORY VENTILATION, LESS THAN 24 CONSECUTIVE HOURS: ICD-10-PCS | Performed by: EMERGENCY MEDICINE

## 2022-01-01 PROCEDURE — 94002 VENT MGMT INPAT INIT DAY: CPT

## 2022-01-01 PROCEDURE — 87040 BLOOD CULTURE FOR BACTERIA: CPT | Performed by: EMERGENCY MEDICINE

## 2022-01-01 PROCEDURE — 36600 WITHDRAWAL OF ARTERIAL BLOOD: CPT

## 2022-01-01 PROCEDURE — 80053 COMPREHEN METABOLIC PANEL: CPT | Performed by: EMERGENCY MEDICINE

## 2022-01-01 PROCEDURE — 84145 PROCALCITONIN (PCT): CPT | Performed by: EMERGENCY MEDICINE

## 2022-01-01 PROCEDURE — 83880 ASSAY OF NATRIURETIC PEPTIDE: CPT | Performed by: EMERGENCY MEDICINE

## 2022-01-01 PROCEDURE — 85730 THROMBOPLASTIN TIME PARTIAL: CPT | Performed by: EMERGENCY MEDICINE

## 2022-01-01 PROCEDURE — 92950 HEART/LUNG RESUSCITATION CPR: CPT

## 2022-01-01 PROCEDURE — 93005 ELECTROCARDIOGRAM TRACING: CPT | Performed by: EMERGENCY MEDICINE

## 2022-01-01 PROCEDURE — 99291 CRITICAL CARE FIRST HOUR: CPT

## 2022-01-01 PROCEDURE — 71045 X-RAY EXAM CHEST 1 VIEW: CPT

## 2022-01-01 PROCEDURE — 25010000002 EPINEPHRINE 1 MG/10ML SOLUTION PREFILLED SYRINGE: Performed by: EMERGENCY MEDICINE

## 2022-01-01 PROCEDURE — 93010 ELECTROCARDIOGRAM REPORT: CPT | Performed by: INTERNAL MEDICINE

## 2022-01-01 PROCEDURE — 25010000002 PROPOFOL 10 MG/ML EMULSION

## 2022-01-01 PROCEDURE — 82803 BLOOD GASES ANY COMBINATION: CPT

## 2022-01-01 PROCEDURE — 85025 COMPLETE CBC W/AUTO DIFF WBC: CPT | Performed by: EMERGENCY MEDICINE

## 2022-01-01 PROCEDURE — 94761 N-INVAS EAR/PLS OXIMETRY MLT: CPT

## 2022-01-01 PROCEDURE — 36415 COLL VENOUS BLD VENIPUNCTURE: CPT

## 2022-01-01 PROCEDURE — P9612 CATHETERIZE FOR URINE SPEC: HCPCS

## 2022-01-01 PROCEDURE — 85610 PROTHROMBIN TIME: CPT | Performed by: EMERGENCY MEDICINE

## 2022-01-01 PROCEDURE — 81001 URINALYSIS AUTO W/SCOPE: CPT | Performed by: EMERGENCY MEDICINE

## 2022-01-01 PROCEDURE — 87147 CULTURE TYPE IMMUNOLOGIC: CPT | Performed by: EMERGENCY MEDICINE

## 2022-01-01 PROCEDURE — 83605 ASSAY OF LACTIC ACID: CPT | Performed by: EMERGENCY MEDICINE

## 2022-01-01 PROCEDURE — 5A12012 PERFORMANCE OF CARDIAC OUTPUT, SINGLE, MANUAL: ICD-10-PCS | Performed by: EMERGENCY MEDICINE

## 2022-01-01 PROCEDURE — 0BH17EZ INSERTION OF ENDOTRACHEAL AIRWAY INTO TRACHEA, VIA NATURAL OR ARTIFICIAL OPENING: ICD-10-PCS | Performed by: EMERGENCY MEDICINE

## 2022-01-01 RX ORDER — ASPIRIN 300 MG/1
300 SUPPOSITORY RECTAL ONCE
Status: COMPLETED | OUTPATIENT
Start: 2022-01-01 | End: 2022-01-01

## 2022-01-01 RX ORDER — PROPOFOL 10 MG/ML
VIAL (ML) INTRAVENOUS
Status: COMPLETED
Start: 2022-01-01 | End: 2022-01-01

## 2022-01-01 RX ORDER — AMIODARONE HYDROCHLORIDE 50 MG/ML
INJECTION, SOLUTION INTRAVENOUS
Status: COMPLETED | OUTPATIENT
Start: 2022-01-01 | End: 2022-01-01

## 2022-01-01 RX ORDER — ROCURONIUM BROMIDE 10 MG/ML
INJECTION, SOLUTION INTRAVENOUS
Status: COMPLETED | OUTPATIENT
Start: 2022-01-01 | End: 2022-01-01

## 2022-01-01 RX ORDER — EPINEPHRINE 0.1 MG/ML
SYRINGE (ML) INJECTION
Status: COMPLETED | OUTPATIENT
Start: 2022-01-01 | End: 2022-01-01

## 2022-01-01 RX ORDER — ETOMIDATE 2 MG/ML
INJECTION INTRAVENOUS
Status: COMPLETED | OUTPATIENT
Start: 2022-01-01 | End: 2022-01-01

## 2022-01-01 RX ORDER — SODIUM CHLORIDE 0.9 % (FLUSH) 0.9 %
10 SYRINGE (ML) INJECTION AS NEEDED
Status: DISCONTINUED | OUTPATIENT
Start: 2022-01-01 | End: 2022-01-01 | Stop reason: HOSPADM

## 2022-01-01 RX ADMIN — ROCURONIUM BROMIDE 100 MG: 50 INJECTION INTRAVENOUS at 04:39

## 2022-01-01 RX ADMIN — EPINEPHRINE 1 MG: 0.1 INJECTION INTRACARDIAC; INTRAVENOUS at 07:09

## 2022-01-01 RX ADMIN — EPINEPHRINE 1 MG: 0.1 INJECTION INTRACARDIAC; INTRAVENOUS at 07:14

## 2022-01-01 RX ADMIN — SODIUM CHLORIDE 3750 ML: 9 INJECTION, SOLUTION INTRAVENOUS at 06:28

## 2022-01-01 RX ADMIN — ASPIRIN 300 MG: 300 SUPPOSITORY RECTAL at 06:46

## 2022-01-01 RX ADMIN — SODIUM CHLORIDE 1000 ML: 9 INJECTION, SOLUTION INTRAVENOUS at 06:18

## 2022-01-01 RX ADMIN — ETOMIDATE 20 MG: 2 INJECTION, SOLUTION INTRAVENOUS at 04:38

## 2022-01-01 RX ADMIN — PROPOFOL INJECTABLE EMULSION 5 MCG/KG/MIN: 10 INJECTION, EMULSION INTRAVENOUS at 05:53

## 2022-01-01 RX ADMIN — AMIODARONE HYDROCHLORIDE 300 MG: 50 INJECTION, SOLUTION INTRAVENOUS at 07:11

## 2022-09-11 PROBLEM — J96.01 ACUTE RESPIRATORY FAILURE WITH HYPOXIA: Status: ACTIVE | Noted: 2022-01-01

## 2022-09-14 LAB
BACTERIA SPEC AEROBE CULT: ABNORMAL
BACTERIA SPEC AEROBE CULT: ABNORMAL
GRAM STN SPEC: ABNORMAL
GRAM STN SPEC: ABNORMAL
ISOLATED FROM: ABNORMAL
ISOLATED FROM: ABNORMAL

## 2022-09-16 LAB — BACTERIA SPEC AEROBE CULT: NORMAL
